# Patient Record
Sex: FEMALE | Race: OTHER | HISPANIC OR LATINO | URBAN - METROPOLITAN AREA
[De-identification: names, ages, dates, MRNs, and addresses within clinical notes are randomized per-mention and may not be internally consistent; named-entity substitution may affect disease eponyms.]

---

## 2019-08-11 ENCOUNTER — EMERGENCY (EMERGENCY)
Facility: HOSPITAL | Age: 64
LOS: 1 days | Discharge: HOME | End: 2019-08-11
Attending: EMERGENCY MEDICINE | Admitting: EMERGENCY MEDICINE
Payer: COMMERCIAL

## 2019-08-11 VITALS
OXYGEN SATURATION: 100 % | RESPIRATION RATE: 15 BRPM | HEART RATE: 100 BPM | DIASTOLIC BLOOD PRESSURE: 89 MMHG | SYSTOLIC BLOOD PRESSURE: 150 MMHG | TEMPERATURE: 98 F

## 2019-08-11 VITALS
SYSTOLIC BLOOD PRESSURE: 109 MMHG | RESPIRATION RATE: 16 BRPM | OXYGEN SATURATION: 100 % | DIASTOLIC BLOOD PRESSURE: 60 MMHG | HEART RATE: 59 BPM

## 2019-08-11 DIAGNOSIS — E89.0 POSTPROCEDURAL HYPOTHYROIDISM: Chronic | ICD-10-CM

## 2019-08-11 LAB
ALBUMIN SERPL ELPH-MCNC: 4 G/DL — SIGNIFICANT CHANGE UP (ref 3.3–5)
ALP SERPL-CCNC: 153 U/L — HIGH (ref 40–120)
ALT FLD-CCNC: 16 U/L — SIGNIFICANT CHANGE UP (ref 4–33)
ANION GAP SERPL CALC-SCNC: 11 MMO/L — SIGNIFICANT CHANGE UP (ref 7–14)
APPEARANCE UR: CLEAR — SIGNIFICANT CHANGE UP
AST SERPL-CCNC: 21 U/L — SIGNIFICANT CHANGE UP (ref 4–32)
BASE EXCESS BLDV CALC-SCNC: 7.6 MMOL/L — SIGNIFICANT CHANGE UP
BASOPHILS # BLD AUTO: 0.04 K/UL — SIGNIFICANT CHANGE UP (ref 0–0.2)
BASOPHILS NFR BLD AUTO: 0.4 % — SIGNIFICANT CHANGE UP (ref 0–2)
BILIRUB SERPL-MCNC: 0.7 MG/DL — SIGNIFICANT CHANGE UP (ref 0.2–1.2)
BILIRUB UR-MCNC: NEGATIVE — SIGNIFICANT CHANGE UP
BLOOD GAS VENOUS - CREATININE: 0.77 MG/DL — SIGNIFICANT CHANGE UP (ref 0.5–1.3)
BLOOD GAS VENOUS - FIO2: 21 — SIGNIFICANT CHANGE UP
BLOOD UR QL VISUAL: NEGATIVE — SIGNIFICANT CHANGE UP
BUN SERPL-MCNC: 13 MG/DL — SIGNIFICANT CHANGE UP (ref 7–23)
CALCIUM SERPL-MCNC: 9.4 MG/DL — SIGNIFICANT CHANGE UP (ref 8.4–10.5)
CHLORIDE BLDV-SCNC: 105 MMOL/L — SIGNIFICANT CHANGE UP (ref 96–108)
CHLORIDE SERPL-SCNC: 100 MMOL/L — SIGNIFICANT CHANGE UP (ref 98–107)
CO2 SERPL-SCNC: 28 MMOL/L — SIGNIFICANT CHANGE UP (ref 22–31)
COLOR SPEC: SIGNIFICANT CHANGE UP
CREAT SERPL-MCNC: 0.76 MG/DL — SIGNIFICANT CHANGE UP (ref 0.5–1.3)
EOSINOPHIL # BLD AUTO: 0.11 K/UL — SIGNIFICANT CHANGE UP (ref 0–0.5)
EOSINOPHIL NFR BLD AUTO: 1.2 % — SIGNIFICANT CHANGE UP (ref 0–6)
GAS PNL BLDV: 136 MMOL/L — SIGNIFICANT CHANGE UP (ref 136–146)
GLUCOSE BLDV-MCNC: 106 MG/DL — HIGH (ref 70–99)
GLUCOSE SERPL-MCNC: 108 MG/DL — HIGH (ref 70–99)
GLUCOSE UR-MCNC: NEGATIVE — SIGNIFICANT CHANGE UP
HCO3 BLDV-SCNC: 29 MMOL/L — HIGH (ref 20–27)
HCT VFR BLD CALC: 43.9 % — SIGNIFICANT CHANGE UP (ref 34.5–45)
HCT VFR BLDV CALC: 45.1 % — HIGH (ref 34.5–45)
HGB BLD-MCNC: 14.2 G/DL — SIGNIFICANT CHANGE UP (ref 11.5–15.5)
HGB BLDV-MCNC: 14.7 G/DL — SIGNIFICANT CHANGE UP (ref 11.5–15.5)
IMM GRANULOCYTES NFR BLD AUTO: 0.2 % — SIGNIFICANT CHANGE UP (ref 0–1.5)
KETONES UR-MCNC: NEGATIVE — SIGNIFICANT CHANGE UP
LACTATE BLDV-MCNC: 0.9 MMOL/L — SIGNIFICANT CHANGE UP (ref 0.5–2)
LACTATE BLDV-MCNC: 2.2 MMOL/L — HIGH (ref 0.5–2)
LEUKOCYTE ESTERASE UR-ACNC: NEGATIVE — SIGNIFICANT CHANGE UP
LIDOCAIN IGE QN: 19.4 U/L — SIGNIFICANT CHANGE UP (ref 7–60)
LYMPHOCYTES # BLD AUTO: 2.66 K/UL — SIGNIFICANT CHANGE UP (ref 1–3.3)
LYMPHOCYTES # BLD AUTO: 29.9 % — SIGNIFICANT CHANGE UP (ref 13–44)
MCHC RBC-ENTMCNC: 29.1 PG — SIGNIFICANT CHANGE UP (ref 27–34)
MCHC RBC-ENTMCNC: 32.3 % — SIGNIFICANT CHANGE UP (ref 32–36)
MCV RBC AUTO: 90 FL — SIGNIFICANT CHANGE UP (ref 80–100)
MONOCYTES # BLD AUTO: 0.56 K/UL — SIGNIFICANT CHANGE UP (ref 0–0.9)
MONOCYTES NFR BLD AUTO: 6.3 % — SIGNIFICANT CHANGE UP (ref 2–14)
NEUTROPHILS # BLD AUTO: 5.52 K/UL — SIGNIFICANT CHANGE UP (ref 1.8–7.4)
NEUTROPHILS NFR BLD AUTO: 62 % — SIGNIFICANT CHANGE UP (ref 43–77)
NITRITE UR-MCNC: NEGATIVE — SIGNIFICANT CHANGE UP
NRBC # FLD: 0 K/UL — SIGNIFICANT CHANGE UP (ref 0–0)
PCO2 BLDV: 46 MMHG — SIGNIFICANT CHANGE UP (ref 41–51)
PH BLDV: 7.46 PH — HIGH (ref 7.32–7.43)
PH UR: 8.5 — HIGH (ref 5–8)
PLATELET # BLD AUTO: 236 K/UL — SIGNIFICANT CHANGE UP (ref 150–400)
PMV BLD: 10.3 FL — SIGNIFICANT CHANGE UP (ref 7–13)
PO2 BLDV: 21 MMHG — LOW (ref 35–40)
POTASSIUM BLDV-SCNC: 4.4 MMOL/L — SIGNIFICANT CHANGE UP (ref 3.4–4.5)
POTASSIUM SERPL-MCNC: 4 MMOL/L — SIGNIFICANT CHANGE UP (ref 3.5–5.3)
POTASSIUM SERPL-SCNC: 4 MMOL/L — SIGNIFICANT CHANGE UP (ref 3.5–5.3)
PROT SERPL-MCNC: 7.7 G/DL — SIGNIFICANT CHANGE UP (ref 6–8.3)
PROT UR-MCNC: 10 — SIGNIFICANT CHANGE UP
RBC # BLD: 4.88 M/UL — SIGNIFICANT CHANGE UP (ref 3.8–5.2)
RBC # FLD: 14.5 % — SIGNIFICANT CHANGE UP (ref 10.3–14.5)
SAO2 % BLDV: 36.6 % — LOW (ref 60–85)
SODIUM SERPL-SCNC: 139 MMOL/L — SIGNIFICANT CHANGE UP (ref 135–145)
SP GR SPEC: 1.01 — SIGNIFICANT CHANGE UP (ref 1–1.04)
UROBILINOGEN FLD QL: NORMAL — SIGNIFICANT CHANGE UP
WBC # BLD: 8.91 K/UL — SIGNIFICANT CHANGE UP (ref 3.8–10.5)
WBC # FLD AUTO: 8.91 K/UL — SIGNIFICANT CHANGE UP (ref 3.8–10.5)

## 2019-08-11 PROCEDURE — 99284 EMERGENCY DEPT VISIT MOD MDM: CPT

## 2019-08-11 PROCEDURE — 74176 CT ABD & PELVIS W/O CONTRAST: CPT | Mod: 26

## 2019-08-11 PROCEDURE — 76700 US EXAM ABDOM COMPLETE: CPT | Mod: 26

## 2019-08-11 RX ORDER — ACETAMINOPHEN 500 MG
975 TABLET ORAL ONCE
Refills: 0 | Status: COMPLETED | OUTPATIENT
Start: 2019-08-11 | End: 2019-08-11

## 2019-08-11 RX ORDER — OXYCODONE HYDROCHLORIDE 5 MG/1
1 TABLET ORAL
Qty: 6 | Refills: 0
Start: 2019-08-11 | End: 2019-08-12

## 2019-08-11 RX ORDER — MORPHINE SULFATE 50 MG/1
4 CAPSULE, EXTENDED RELEASE ORAL ONCE
Refills: 0 | Status: DISCONTINUED | OUTPATIENT
Start: 2019-08-11 | End: 2019-08-11

## 2019-08-11 RX ORDER — SODIUM CHLORIDE 9 MG/ML
1000 INJECTION INTRAMUSCULAR; INTRAVENOUS; SUBCUTANEOUS ONCE
Refills: 0 | Status: COMPLETED | OUTPATIENT
Start: 2019-08-11 | End: 2019-08-11

## 2019-08-11 RX ORDER — FAMOTIDINE 10 MG/ML
20 INJECTION INTRAVENOUS ONCE
Refills: 0 | Status: COMPLETED | OUTPATIENT
Start: 2019-08-11 | End: 2019-08-11

## 2019-08-11 RX ORDER — HYDROMORPHONE HYDROCHLORIDE 2 MG/ML
1 INJECTION INTRAMUSCULAR; INTRAVENOUS; SUBCUTANEOUS ONCE
Refills: 0 | Status: DISCONTINUED | OUTPATIENT
Start: 2019-08-11 | End: 2019-08-11

## 2019-08-11 RX ORDER — ONDANSETRON 8 MG/1
4 TABLET, FILM COATED ORAL ONCE
Refills: 0 | Status: COMPLETED | OUTPATIENT
Start: 2019-08-11 | End: 2019-08-11

## 2019-08-11 RX ORDER — KETOROLAC TROMETHAMINE 30 MG/ML
15 SYRINGE (ML) INJECTION ONCE
Refills: 0 | Status: DISCONTINUED | OUTPATIENT
Start: 2019-08-11 | End: 2019-08-11

## 2019-08-11 RX ADMIN — HYDROMORPHONE HYDROCHLORIDE 1 MILLIGRAM(S): 2 INJECTION INTRAMUSCULAR; INTRAVENOUS; SUBCUTANEOUS at 10:14

## 2019-08-11 RX ADMIN — MORPHINE SULFATE 4 MILLIGRAM(S): 50 CAPSULE, EXTENDED RELEASE ORAL at 08:29

## 2019-08-11 RX ADMIN — FAMOTIDINE 20 MILLIGRAM(S): 10 INJECTION INTRAVENOUS at 07:16

## 2019-08-11 RX ADMIN — Medication 975 MILLIGRAM(S): at 09:44

## 2019-08-11 RX ADMIN — MORPHINE SULFATE 4 MILLIGRAM(S): 50 CAPSULE, EXTENDED RELEASE ORAL at 09:00

## 2019-08-11 RX ADMIN — MORPHINE SULFATE 4 MILLIGRAM(S): 50 CAPSULE, EXTENDED RELEASE ORAL at 07:15

## 2019-08-11 RX ADMIN — HYDROMORPHONE HYDROCHLORIDE 1 MILLIGRAM(S): 2 INJECTION INTRAMUSCULAR; INTRAVENOUS; SUBCUTANEOUS at 09:44

## 2019-08-11 RX ADMIN — ONDANSETRON 4 MILLIGRAM(S): 8 TABLET, FILM COATED ORAL at 07:14

## 2019-08-11 RX ADMIN — Medication 15 MILLIGRAM(S): at 08:24

## 2019-08-11 RX ADMIN — Medication 975 MILLIGRAM(S): at 10:14

## 2019-08-11 RX ADMIN — Medication 30 MILLILITER(S): at 07:16

## 2019-08-11 RX ADMIN — Medication 15 MILLIGRAM(S): at 07:54

## 2019-08-11 RX ADMIN — SODIUM CHLORIDE 1000 MILLILITER(S): 9 INJECTION INTRAMUSCULAR; INTRAVENOUS; SUBCUTANEOUS at 07:13

## 2019-08-11 RX ADMIN — MORPHINE SULFATE 4 MILLIGRAM(S): 50 CAPSULE, EXTENDED RELEASE ORAL at 08:33

## 2019-08-11 NOTE — ED ADULT NURSE NOTE - NSIMPLEMENTINTERV_GEN_ALL_ED
Implemented All Fall Risk Interventions:  [place] to call system. Call bell, personal items and telephone within reach. Instruct patient to call for assistance. Room bathroom lighting operational. Non-slip footwear when patient is off stretcher. Physically safe environment: no spills, clutter or unnecessary equipment. Stretcher in lowest position, wheels locked, appropriate side rails in place. Provide visual cue, wrist band, yellow gown, etc. Monitor gait and stability. Monitor for mental status changes and reorient to person, place, and time. Review medications for side effects contributing to fall risk. Reinforce activity limits and safety measures with patient and family.

## 2019-08-11 NOTE — ED ADULT TRIAGE NOTE - CHIEF COMPLAINT QUOTE
alert c/o severe right flank pain with nausea x 1 week became much worse yesterday   was initially intermittent now constant   hx gallstones  thyroidectomy very uncomfortable in triage

## 2019-08-11 NOTE — ED PROVIDER NOTE - CLINICAL SUMMARY MEDICAL DECISION MAKING FREE TEXT BOX
63 yo F c PMH of thyroid ca (s/p thyroidectomy 11 y/a, no h/o chemo/rad, in remission), HTN, gallstones p/w 3 day h/o worsening intermittent R flank pain radiating to R suprapubic region associated with nausea. On exam, , BP mildly elevated VS otherwise wnl. severe distress, +ruq and epigastric and R suprapubic ttp. abdirashid vs kidney stone. labs/ruq us pending. morphine/pepcid/maalox/zofran/ivf for tx. will reassess.

## 2019-08-11 NOTE — ED ADULT NURSE NOTE - OBJECTIVE STATEMENT
Pt received in room 3, AA&OX4. Pt has hx of HTN and gallstones. Pt c/o severe right flank pain for the past week worsening Sunday morning. Pt c/o nausea from pain. Pt states she had gallstones a couple of months ago and the pain went away on its own and has now returned. Pt reports pain was intermittent a throughout the week but is now constant with no relief. Pt looks very uncomfortable in stretcher. Pt denies CP, SOB, weakness, headache, fever, chills, vomiting, diarrhea. 20G placed in LT AC, labs collected and sent. Medication given as per MD order. Will continue to monitor.

## 2019-08-11 NOTE — ED PROVIDER NOTE - ATTENDING CONTRIBUTION TO CARE
65 y/o F with h/o HTN, gallstones here with right flank pain.  Pt reports 2-3 days of intermittent pain that became constant tonight.  Pt reports pain radiates to epigastrum.  Associated nausea, but denies vomiting, fever, diarrhea, urinary sxs.  Pt reports similar pain in the past andhad outpatient ultrasound performed showing gallstones at the time.  Well appearing, lying comfortably in stretcher, awake and alert, nontoxic.  VSS.  Lungs cta bl.  Cards nl S1/S2, RRR, no MRG.  Abd soft with mild epigastric and RUQ tenderness, right upper flank tenderness, no rebound or guarding, no cva tenderness, no rlq/mcburney's point tenderness.  Plan for labs, ua, ruq and renal us, ivfs, pain meds/antiemetics, reassess.  COnsider renal colic vs biliary disease also consider gastritis, pancreatitis.  If US unrevealing, will consider CT if cont symptomatology.

## 2019-08-11 NOTE — ED ADULT NURSE NOTE - CAS EDN DISCHARGE ASSESSMENT
Alert and oriented to person, place and time/AOx4, improved; denies flank pain/ other acute complaints at this time. Steady gait, well-appearing.

## 2019-08-11 NOTE — ED PROVIDER NOTE - NSFOLLOWUPINSTRUCTIONS_ED_ALL_ED_FT
Follow up with your primary physician or general surgery in 1-2 days. If needed call [phone] to find a primary care physician or call  [phone] to schedule an appointment with the general medicine.     You can take acetaminophen (aka tylenol, 1000 mg every 6-8 hours) for pain. You can also take ibuprofen (600 mg every 6-8 hours) in addition if tylenol alone does not improve the pain. Do not exceed 4000 mg acetaminophen (tylenol) in a 24 hour period. Be aware if any of your other medications contain acetaminophen so as not to exceed the maximum daily dose.    Return to the ER for recurrent severe pain, vomiting, fever, or any other new concerning symptoms.

## 2019-08-11 NOTE — ED PROVIDER NOTE - OBJECTIVE STATEMENT
65 yo F c PMH of thyroid ca (s/p thyroidectomy 11 y/a, no h/o chemo/rad, in remission), HTN, gallstones p/w 3 day h/o worsening intermittent R flank pain radiating to R suprapubic region associated with nausea. positive h/o starting 5 m/a, went to pmd who did outpt US showing gallstones, did not have cholecystectomy. last BM today wnl.

## 2019-08-11 NOTE — ED PROVIDER NOTE - PROGRESS NOTE DETAILS
Jonathan Weil, PGY3 - US non-diagnostic. Patient still in pain. Will obtain CT a/p. Jonathan Weil, PGY3 - patient reassessed. She is feeling substantially improved, pain now 2/10 compared to "11/10" at presentation. No abdominal tenderness. Negative Carr's sign. Trace R flank ttp, reduced from earlier. We discussed the results, and agreed on a plan for close outpt f/u and strict return precautions for recurrent pain or any other new symptoms. The patient feels comfortable with this plan. Results from today's visit were reviewed with the patient and a copy of the results provided for their records. All questions were addressed. Gong: Patient seen and re-examined.  Pt has no abdominal tenderness, neg mtz's.  Pt has R flank intermittent pain. No signs of nephrolithiasis on ct abd pelvis and US grossly neg.  Will discharge home with close follow up.

## 2019-08-12 ENCOUNTER — EMERGENCY (EMERGENCY)
Facility: HOSPITAL | Age: 64
LOS: 1 days | Discharge: HOME | End: 2019-08-12
Attending: EMERGENCY MEDICINE | Admitting: EMERGENCY MEDICINE
Payer: COMMERCIAL

## 2019-08-12 VITALS
HEART RATE: 88 BPM | TEMPERATURE: 98 F | SYSTOLIC BLOOD PRESSURE: 155 MMHG | OXYGEN SATURATION: 97 % | DIASTOLIC BLOOD PRESSURE: 97 MMHG | RESPIRATION RATE: 18 BRPM

## 2019-08-12 DIAGNOSIS — E89.0 POSTPROCEDURAL HYPOTHYROIDISM: Chronic | ICD-10-CM

## 2019-08-12 LAB
ALBUMIN SERPL ELPH-MCNC: 3.7 G/DL — SIGNIFICANT CHANGE UP (ref 3.3–5)
ALP SERPL-CCNC: 150 U/L — HIGH (ref 40–120)
ALT FLD-CCNC: 21 U/L — SIGNIFICANT CHANGE UP (ref 4–33)
ANION GAP SERPL CALC-SCNC: 11 MMO/L — SIGNIFICANT CHANGE UP (ref 7–14)
AST SERPL-CCNC: 47 U/L — HIGH (ref 4–32)
BACTERIA UR CULT: SIGNIFICANT CHANGE UP
BASOPHILS # BLD AUTO: 0.04 K/UL — SIGNIFICANT CHANGE UP (ref 0–0.2)
BASOPHILS NFR BLD AUTO: 0.5 % — SIGNIFICANT CHANGE UP (ref 0–2)
BILIRUB SERPL-MCNC: 0.3 MG/DL — SIGNIFICANT CHANGE UP (ref 0.2–1.2)
BUN SERPL-MCNC: 17 MG/DL — SIGNIFICANT CHANGE UP (ref 7–23)
CALCIUM SERPL-MCNC: 8.8 MG/DL — SIGNIFICANT CHANGE UP (ref 8.4–10.5)
CHLORIDE SERPL-SCNC: 102 MMOL/L — SIGNIFICANT CHANGE UP (ref 98–107)
CO2 SERPL-SCNC: 25 MMOL/L — SIGNIFICANT CHANGE UP (ref 22–31)
CREAT SERPL-MCNC: 0.67 MG/DL — SIGNIFICANT CHANGE UP (ref 0.5–1.3)
EOSINOPHIL # BLD AUTO: 0.12 K/UL — SIGNIFICANT CHANGE UP (ref 0–0.5)
EOSINOPHIL NFR BLD AUTO: 1.4 % — SIGNIFICANT CHANGE UP (ref 0–6)
GLUCOSE SERPL-MCNC: 123 MG/DL — HIGH (ref 70–99)
HCT VFR BLD CALC: 41.5 % — SIGNIFICANT CHANGE UP (ref 34.5–45)
HGB BLD-MCNC: 13.4 G/DL — SIGNIFICANT CHANGE UP (ref 11.5–15.5)
IMM GRANULOCYTES NFR BLD AUTO: 0.3 % — SIGNIFICANT CHANGE UP (ref 0–1.5)
LYMPHOCYTES # BLD AUTO: 3.45 K/UL — HIGH (ref 1–3.3)
LYMPHOCYTES # BLD AUTO: 39.3 % — SIGNIFICANT CHANGE UP (ref 13–44)
MCHC RBC-ENTMCNC: 29.6 PG — SIGNIFICANT CHANGE UP (ref 27–34)
MCHC RBC-ENTMCNC: 32.3 % — SIGNIFICANT CHANGE UP (ref 32–36)
MCV RBC AUTO: 91.8 FL — SIGNIFICANT CHANGE UP (ref 80–100)
MONOCYTES # BLD AUTO: 0.5 K/UL — SIGNIFICANT CHANGE UP (ref 0–0.9)
MONOCYTES NFR BLD AUTO: 5.7 % — SIGNIFICANT CHANGE UP (ref 2–14)
NEUTROPHILS # BLD AUTO: 4.64 K/UL — SIGNIFICANT CHANGE UP (ref 1.8–7.4)
NEUTROPHILS NFR BLD AUTO: 52.8 % — SIGNIFICANT CHANGE UP (ref 43–77)
NRBC # FLD: 0 K/UL — SIGNIFICANT CHANGE UP (ref 0–0)
PLATELET # BLD AUTO: 227 K/UL — SIGNIFICANT CHANGE UP (ref 150–400)
PMV BLD: 10.2 FL — SIGNIFICANT CHANGE UP (ref 7–13)
POTASSIUM SERPL-MCNC: 5 MMOL/L — SIGNIFICANT CHANGE UP (ref 3.5–5.3)
POTASSIUM SERPL-SCNC: 5 MMOL/L — SIGNIFICANT CHANGE UP (ref 3.5–5.3)
PROT SERPL-MCNC: 7.7 G/DL — SIGNIFICANT CHANGE UP (ref 6–8.3)
RBC # BLD: 4.52 M/UL — SIGNIFICANT CHANGE UP (ref 3.8–5.2)
RBC # FLD: 14.6 % — HIGH (ref 10.3–14.5)
SODIUM SERPL-SCNC: 138 MMOL/L — SIGNIFICANT CHANGE UP (ref 135–145)
SPECIMEN SOURCE: SIGNIFICANT CHANGE UP
WBC # BLD: 8.78 K/UL — SIGNIFICANT CHANGE UP (ref 3.8–10.5)
WBC # FLD AUTO: 8.78 K/UL — SIGNIFICANT CHANGE UP (ref 3.8–10.5)

## 2019-08-12 PROCEDURE — 99284 EMERGENCY DEPT VISIT MOD MDM: CPT

## 2019-08-12 RX ORDER — DIAZEPAM 5 MG
5 TABLET ORAL ONCE
Refills: 0 | Status: DISCONTINUED | OUTPATIENT
Start: 2019-08-12 | End: 2019-08-12

## 2019-08-12 RX ORDER — ONDANSETRON 8 MG/1
4 TABLET, FILM COATED ORAL ONCE
Refills: 0 | Status: COMPLETED | OUTPATIENT
Start: 2019-08-12 | End: 2019-08-12

## 2019-08-12 RX ORDER — KETOROLAC TROMETHAMINE 30 MG/ML
15 SYRINGE (ML) INJECTION ONCE
Refills: 0 | Status: DISCONTINUED | OUTPATIENT
Start: 2019-08-12 | End: 2019-08-12

## 2019-08-12 RX ORDER — LIDOCAINE 4 G/100G
1 CREAM TOPICAL ONCE
Refills: 0 | Status: COMPLETED | OUTPATIENT
Start: 2019-08-12 | End: 2019-08-12

## 2019-08-12 RX ADMIN — LIDOCAINE 1 PATCH: 4 CREAM TOPICAL at 22:31

## 2019-08-12 RX ADMIN — Medication 5 MILLIGRAM(S): at 22:31

## 2019-08-12 RX ADMIN — ONDANSETRON 4 MILLIGRAM(S): 8 TABLET, FILM COATED ORAL at 23:38

## 2019-08-12 RX ADMIN — Medication 15 MILLIGRAM(S): at 22:31

## 2019-08-12 NOTE — ED ADULT TRIAGE NOTE - CHIEF COMPLAINT QUOTE
Pt c/o  intermittent R side abd pain. As per pt, was seen in ER yesterday and told she has gallstones. Pt appears uncomfortable when pain comes on.. C/o Nausea. Pain worsens when eating greasy food. Hx: HTN

## 2019-08-12 NOTE — ED PROVIDER NOTE - NS ED ROS FT
ROS:  GENERAL: No fever, no chills  EYES: no change in vision  HEENT: no trouble swallowing, no trouble speaking  CARDIAC: no chest pain  PULMONARY: no cough, no shortness of breath  GI: no abdominal pain, no nausea, no vomiting, no diarrhea, no constipation  : No dysuria, no frequency, no change in appearance, or odor of urine  SKIN: no rashes  NEURO: no headache, no weakness  MSK: +flank pain

## 2019-08-12 NOTE — ED PROVIDER NOTE - ATTENDING CONTRIBUTION TO CARE
Patient presented to ed with 3 days of r. mid back/flank pain, intermittent, severe, sharp, 10/10, nonradiating. No associated fever, chills, ha, cp, sob, abd pain, vomiting, diarrhea, dysuria. Had labs/ct/us performed for same pain in ed just prior and no acute pathology found. Pain worsens primarily on movement, improves when staying still.  exam  GEN - NAD; A+O x3   HEAD - NC/AT   EYES- PERRL, EOMI  ENT: Airway patent, mmm, Oral cavity and pharynx normal.    NECK: Neck supple, non-tender without lymphadenopathy, no masses.  PULMONARY - CTA b/l, symmetric breath sounds.   CARDIAC -s1s2, RRR, no M,G,R  ABDOMEN - +BS, ND, NT, soft, no guarding, no rebound, no masses   BACK - no CVA tenderness, Normal  spine, no midline or paraspinal tenderness, neg slr, pain reproducible with ambulation and with ranging of trunk.   EXTREMITIES - FROM, symmetric pulses, capillary refill < 2 seconds, no edema   SKIN - no rash or bruising   NEUROLOGIC - alert, speech clear, 5/5 strength in b/l ue and b/l le, sensation intact.  PSYCH -nl mood/affect, nl insight.  a/p-patient presented to ed with 3d r. back/flank pain with previously neg ct/us-suspect msk cause-strain/spasm v. herniated disc. Neuro intact, well appearing, vss. Plan for pain ctrl, reass.

## 2019-08-12 NOTE — ED PROVIDER NOTE - OBJECTIVE STATEMENT
63 y/o F PMHx HTN, HLD, hypothyroidism (sp thyroidectomy for thyroid CA, in remission), fibromyalgia, morbid obesity here w/ R flank pain x 3days. Pain is described as sharp and burning, worse w/movement. Pain is not pleuritic, does not get worse w/ PO intake (ate chicken parmesan tonight w/o worsening of sx). Was seen in the ED yesterday, had a CT abdomen and GB US which confirmed cholelithiasis. Pt states her pain temporarily improved in the ED, but never resolved. Took Percocet this morning w/o relief of pain. Denies fevers, chills, N/V/D, dysuria, rashes, shortness of breath or any other complaints.

## 2019-08-12 NOTE — ED PROVIDER NOTE - CLINICAL SUMMARY MEDICAL DECISION MAKING FREE TEXT BOX
63 y/o F w/ flank pain x 3 days, sp ct abdomen and RUQ us that showed cholelithiasis. No rashes to suggest shingles. Pain is not pleuritic to suggest PE, no consolidations or pleural effusions noted to the R lower lung base which could cause pts pain. Pain is likely msk, will treat w/ valium, toradol and lidocaine patch. Reassess.

## 2019-08-12 NOTE — ED ADULT NURSE NOTE - OBJECTIVE STATEMENT
Received pt in intake room 13. pt A&Ox3, ambulatory. Pt c/o intermittent R side abd pain that radiates to the right lower back x a few days. As per pt, was seen in ER yesterday and told she has gallstones. Pt appears uncomfortable, but in no apparent distress. As per pt the pain is worse when eating greasy foods. HX. HTN. Respirations are equal, nonlabored, no respiratory distress noted. Denies any recent trauma/injury/or fall. No obvious injuries or deformities noted to the back. Denies chest pain, sob, n/v/d, fever, cough, dizziness, headache, urinary complications. 20 gauge IV placed in the right arm, labs sent. pt medicated as per orders. awaiting further plan

## 2019-08-12 NOTE — ED PROVIDER NOTE - PHYSICAL EXAMINATION
Back: No visible deformities or swelling, neck supple non tender, no midline tenderness, no reproducible paraspinal tenderness, ROM intact, sensation intact, dp/pt pulses 2+, lower and upper extremity strength 5/5 PHILLIP, gait normal

## 2019-08-12 NOTE — ED PROVIDER NOTE - PROGRESS NOTE DETAILS
JERSEY Cochran: Pt reassessed, feeling better however continues have back pain. Will put pt in CDU for pain control, MRI T/L spine. Sx likely 2/2 muscle spasms. Spoke w/ MRI tech, state knee replacements are MRI compatible. PT ok w/ staying overnight for MRI and CDU.

## 2019-08-13 VITALS
OXYGEN SATURATION: 95 % | HEART RATE: 68 BPM | DIASTOLIC BLOOD PRESSURE: 78 MMHG | SYSTOLIC BLOOD PRESSURE: 133 MMHG | RESPIRATION RATE: 17 BRPM | TEMPERATURE: 97 F

## 2019-08-13 PROBLEM — C73 MALIGNANT NEOPLASM OF THYROID GLAND: Chronic | Status: ACTIVE | Noted: 2019-08-11

## 2019-08-13 PROCEDURE — 72148 MRI LUMBAR SPINE W/O DYE: CPT | Mod: 26

## 2019-08-13 PROCEDURE — 72146 MRI CHEST SPINE W/O DYE: CPT | Mod: 26

## 2019-08-13 PROCEDURE — 99217: CPT

## 2019-08-13 RX ORDER — OXYCODONE AND ACETAMINOPHEN 5; 325 MG/1; MG/1
1 TABLET ORAL
Qty: 18 | Refills: 0
Start: 2019-08-13 | End: 2019-08-15

## 2019-08-13 RX ORDER — FENTANYL CITRATE 50 UG/ML
25 INJECTION INTRAVENOUS ONCE
Refills: 0 | Status: DISCONTINUED | OUTPATIENT
Start: 2019-08-13 | End: 2019-08-13

## 2019-08-13 RX ORDER — ATORVASTATIN CALCIUM 80 MG/1
40 TABLET, FILM COATED ORAL AT BEDTIME
Refills: 0 | Status: DISCONTINUED | OUTPATIENT
Start: 2019-08-13 | End: 2019-08-16

## 2019-08-13 RX ORDER — KETOROLAC TROMETHAMINE 30 MG/ML
15 SYRINGE (ML) INJECTION ONCE
Refills: 0 | Status: DISCONTINUED | OUTPATIENT
Start: 2019-08-13 | End: 2019-08-13

## 2019-08-13 RX ORDER — GABAPENTIN 400 MG/1
300 CAPSULE ORAL ONCE
Refills: 0 | Status: COMPLETED | OUTPATIENT
Start: 2019-08-13 | End: 2019-08-13

## 2019-08-13 RX ORDER — DIAZEPAM 5 MG
5 TABLET ORAL ONCE
Refills: 0 | Status: DISCONTINUED | OUTPATIENT
Start: 2019-08-13 | End: 2019-08-13

## 2019-08-13 RX ORDER — LEVOTHYROXINE SODIUM 125 MCG
175 TABLET ORAL DAILY
Refills: 0 | Status: DISCONTINUED | OUTPATIENT
Start: 2019-08-13 | End: 2019-08-16

## 2019-08-13 RX ORDER — AMLODIPINE BESYLATE 2.5 MG/1
5 TABLET ORAL ONCE
Refills: 0 | Status: COMPLETED | OUTPATIENT
Start: 2019-08-13 | End: 2019-08-13

## 2019-08-13 RX ORDER — LISINOPRIL 2.5 MG/1
40 TABLET ORAL DAILY
Refills: 0 | Status: DISCONTINUED | OUTPATIENT
Start: 2019-08-13 | End: 2019-08-16

## 2019-08-13 RX ORDER — CYCLOBENZAPRINE HYDROCHLORIDE 10 MG/1
1 TABLET, FILM COATED ORAL
Qty: 15 | Refills: 0
Start: 2019-08-13 | End: 2019-08-17

## 2019-08-13 RX ORDER — DIAZEPAM 5 MG
1 TABLET ORAL
Qty: 9 | Refills: 0
Start: 2019-08-13 | End: 2019-08-15

## 2019-08-13 RX ORDER — IBUPROFEN 200 MG
1 TABLET ORAL
Qty: 21 | Refills: 0
Start: 2019-08-13 | End: 2019-08-19

## 2019-08-13 RX ORDER — OXYCODONE AND ACETAMINOPHEN 5; 325 MG/1; MG/1
1 TABLET ORAL ONCE
Refills: 0 | Status: DISCONTINUED | OUTPATIENT
Start: 2019-08-13 | End: 2019-08-13

## 2019-08-13 RX ADMIN — Medication 15 MILLIGRAM(S): at 09:37

## 2019-08-13 RX ADMIN — GABAPENTIN 300 MILLIGRAM(S): 400 CAPSULE ORAL at 06:06

## 2019-08-13 RX ADMIN — OXYCODONE AND ACETAMINOPHEN 1 TABLET(S): 5; 325 TABLET ORAL at 09:37

## 2019-08-13 RX ADMIN — Medication 5 MILLIGRAM(S): at 10:23

## 2019-08-13 RX ADMIN — FENTANYL CITRATE 25 MICROGRAM(S): 50 INJECTION INTRAVENOUS at 00:46

## 2019-08-13 RX ADMIN — Medication 175 MICROGRAM(S): at 06:05

## 2019-08-13 NOTE — ED CDU PROVIDER INITIAL DAY NOTE - ATTENDING CONTRIBUTION TO CARE
Dr. Cordon:  I performed a face to face bedside interview with patient regarding history of present illness, review of symptoms and past medical history. I completed an independent physical exam.  I have discussed patient's plan of care with PA.   I agree with note as stated above, having amended the EMR as needed to reflect my findings.   This includes HISTORY OF PRESENT ILLNESS, HIV, PAST MEDICAL/SURGICAL/FAMILY/SOCIAL HISTORY, ALLERGIES AND HOME MEDICATIONS, REVIEW OF SYSTEMS, PHYSICAL EXAM, and any PROGRESS NOTES during the time I functioned as the attending physician for this patient.    64F h/o HTN, HLD, hypothyroidism, presented to ED with 3 days of right flank pain, worse with movement.  No neuro deficits.    Exam:  - nad  - rrr  - ctab   -abd soft ntnd  - no spinal midline TTP, 5/5 strength BLE    A/P  - likely muscular pain, pending MRI in CDU

## 2019-08-13 NOTE — ED CDU PROVIDER INITIAL DAY NOTE - OBJECTIVE STATEMENT
63 y/o F PMHx HTN, HLD, hypothyroidism (sp thyroidectomy for thyroid CA, in remission), fibromyalgia, morbid obesity here w/ R flank pain x 3days. Pain is described as sharp and burning, worse w/movement. Pain is not pleuritic, does not get worse w/ PO intake (ate chicken parmesan tonight w/o worsening of sx). Was seen in the ED yesterday, had a CT abdomen and GB US which confirmed cholelithiasis. Pt states her pain temporarily improved in the ED, but never resolved. Took Percocet this morning w/o relief of pain. Denies fevers, chills, N/V/D, dysuria, rashes, shortness of breath or any other complaints.    CDU note: 63 y/o F PMHx HTN, HLD, hypothyroidism (sp thyroidectomy for thyroid CA, in remission), fibromyalgia, morbid obesity presents to the ED complaining of right flank pain for 3 days. Pt states pain is sharp, radiates to the mid-low back. worse with movement, no relieving factors. Pt was seen here yesterday, had CT A/P which only showed gallstones. Pt returns today with worsening low back pain; denies weakness, numbness, tingling sensation, urinary/bowel incontinence; denies any other complaints.

## 2019-08-13 NOTE — ED CDU PROVIDER DISPOSITION NOTE - ATTENDING CONTRIBUTION TO CARE
Dr. Cordon:  I performed a face to face bedside interview with patient regarding history of present illness, review of symptoms and past medical history. I completed an independent physical exam.  I have discussed patient's plan of care with PA.   I agree with note as stated above, having amended the EMR as needed to reflect my findings.   This includes HISTORY OF PRESENT ILLNESS, HIV, PAST MEDICAL/SURGICAL/FAMILY/SOCIAL HISTORY, ALLERGIES AND HOME MEDICATIONS, REVIEW OF SYSTEMS, PHYSICAL EXAM, and any PROGRESS NOTES during the time I functioned as the attending physician for this patient.

## 2019-08-13 NOTE — ED CDU PROVIDER INITIAL DAY NOTE - MEDICAL DECISION MAKING DETAILS
65 y/o F PMHx HTN, HLD, hypothyroidism (sp thyroidectomy for thyroid CA, in remission), fibromyalgia, morbid obesity presents to the ED complaining of right flank pain for 3 days. Pain clinically suspicious for lumbar spine etiology although no clinical evidence of compression cord syndrome at this time. Pt is placed in CDU for MR thoracolumbar spine, pain control. Monitor and Reassess.

## 2019-08-13 NOTE — ED CDU PROVIDER DISPOSITION NOTE - CLINICAL COURSE
Bravo:  64F h/o HTN, HLD, hypothyroidism, presented to ED with 3 days of right flank pain, worse with movement.  No neuro deficits.  Sent to CDU for pain control and MRI.  MRI with some disc bulging and foraminal narrowing but no acute pathology requiring intervention.  Pain improved.  Discharge with meds and outpatient follow up.

## 2019-08-13 NOTE — ED CDU PROVIDER DISPOSITION NOTE - NSFOLLOWUPCLINICS_GEN_ALL_ED_FT
[facility]  .  [place]   Phone: [phone]  Fax:   Follow Up Time:

## 2019-08-13 NOTE — ED CDU PROVIDER INITIAL DAY NOTE - DETAILS
63 y/o F PMHx HTN, HLD, hypothyroidism (sp thyroidectomy for thyroid CA, in remission), fibromyalgia, morbid obesity presents to the ED complaining of right flank pain for 3 days. Pain clinically suspicious for lumbar spine etiology although no clinical evidence of compression cord syndrome at this time. Pt is placed in CDU for MR thoracolumbar spine, pain control. Monitor and Reassess.

## 2019-08-13 NOTE — ED CDU PROVIDER INITIAL DAY NOTE - PROGRESS NOTE DETAILS
Sign out report received from overnight PA, pt ambulatory in CDU from bed to bathroom, MRI shows multi-level disc protrusions w/ no evidence of cord compression. Pt stable to be discharged from the CDU, pt advised to f/u with Ortho outpatient, pt will likely benefit from physical therapy, acutely will be treated w/ nsaid's, percocet and muscle relaxants, pt advised of the compounding effects of percocet and flexeril, advised no driving, etoh use w/ these meds, no operating heavy machinery. Pt expressed understanding of instructions and will f/u accordingly.

## 2019-08-13 NOTE — ED CDU PROVIDER DISPOSITION NOTE - NSFOLLOWUPINSTRUCTIONS_ED_ALL_ED_FT
Follow up with your primary care provider within 48 hours  Follow up with an orthopedic doctor within one week  Take Motrin 600mg every 8 hours as needed for pain, take with food. Take Percocet 325/5 once every 6 hours as needed for severe pain -- causes drowsiness; DO NOT drink alcohol, drive, or operate heavy machinery with this medication.  Caution federal law prohibits the transfer of this drug to any person other  than the person for whom it was prescribed. May cause drowsiness.  Alcohol may intensify this effect.  Use care when operating dangerous machinery.  This prescription cannot be refilled. Using more of this medication than prescribed may cause serious breathing problems Return to the emergency department with any worsening or concerning symptoms, swelling, numbness/tingling, inability to bear weight or any other concerns.

## 2020-01-16 ENCOUNTER — APPOINTMENT (OUTPATIENT)
Dept: DERMATOLOGY | Facility: CLINIC | Age: 65
End: 2020-01-16

## 2020-02-17 ENCOUNTER — TRANSCRIPTION ENCOUNTER (OUTPATIENT)
Age: 65
End: 2020-02-17

## 2021-09-23 ENCOUNTER — NON-APPOINTMENT (OUTPATIENT)
Age: 66
End: 2021-09-23

## 2021-12-09 ENCOUNTER — APPOINTMENT (OUTPATIENT)
Dept: NEUROLOGY | Facility: CLINIC | Age: 66
End: 2021-12-09

## 2022-03-18 ENCOUNTER — APPOINTMENT (OUTPATIENT)
Dept: MRI IMAGING | Facility: IMAGING CENTER | Age: 67
End: 2022-03-18

## 2022-04-22 ENCOUNTER — EMERGENCY (EMERGENCY)
Facility: HOSPITAL | Age: 67
LOS: 1 days | Discharge: HOME | End: 2022-04-22
Attending: EMERGENCY MEDICINE | Admitting: EMERGENCY MEDICINE
Payer: MEDICARE

## 2022-04-22 VITALS
HEART RATE: 100 BPM | OXYGEN SATURATION: 100 % | RESPIRATION RATE: 16 BRPM | SYSTOLIC BLOOD PRESSURE: 150 MMHG | TEMPERATURE: 98 F | DIASTOLIC BLOOD PRESSURE: 78 MMHG

## 2022-04-22 VITALS
OXYGEN SATURATION: 100 % | SYSTOLIC BLOOD PRESSURE: 130 MMHG | DIASTOLIC BLOOD PRESSURE: 76 MMHG | HEART RATE: 80 BPM | RESPIRATION RATE: 18 BRPM

## 2022-04-22 DIAGNOSIS — E89.0 POSTPROCEDURAL HYPOTHYROIDISM: Chronic | ICD-10-CM

## 2022-04-22 LAB
ANION GAP SERPL CALC-SCNC: 10 MMOL/L — SIGNIFICANT CHANGE UP (ref 7–14)
BASOPHILS # BLD AUTO: 0.04 K/UL — SIGNIFICANT CHANGE UP (ref 0–0.2)
BASOPHILS NFR BLD AUTO: 0.4 % — SIGNIFICANT CHANGE UP (ref 0–2)
BUN SERPL-MCNC: 16 MG/DL — SIGNIFICANT CHANGE UP (ref 7–23)
CALCIUM SERPL-MCNC: 9.3 MG/DL — SIGNIFICANT CHANGE UP (ref 8.4–10.5)
CHLORIDE SERPL-SCNC: 105 MMOL/L — SIGNIFICANT CHANGE UP (ref 98–107)
CO2 SERPL-SCNC: 29 MMOL/L — SIGNIFICANT CHANGE UP (ref 22–31)
CREAT SERPL-MCNC: 0.75 MG/DL — SIGNIFICANT CHANGE UP (ref 0.5–1.3)
EGFR: 88 ML/MIN/1.73M2 — SIGNIFICANT CHANGE UP
EOSINOPHIL # BLD AUTO: 0.08 K/UL — SIGNIFICANT CHANGE UP (ref 0–0.5)
EOSINOPHIL NFR BLD AUTO: 0.9 % — SIGNIFICANT CHANGE UP (ref 0–6)
GLUCOSE SERPL-MCNC: 87 MG/DL — SIGNIFICANT CHANGE UP (ref 70–99)
HCT VFR BLD CALC: 42.5 % — SIGNIFICANT CHANGE UP (ref 34.5–45)
HGB BLD-MCNC: 13.6 G/DL — SIGNIFICANT CHANGE UP (ref 11.5–15.5)
IANC: 6.34 K/UL — SIGNIFICANT CHANGE UP (ref 1.8–7.4)
IMM GRANULOCYTES NFR BLD AUTO: 0.2 % — SIGNIFICANT CHANGE UP (ref 0–1.5)
LYMPHOCYTES # BLD AUTO: 2.08 K/UL — SIGNIFICANT CHANGE UP (ref 1–3.3)
LYMPHOCYTES # BLD AUTO: 22.8 % — SIGNIFICANT CHANGE UP (ref 13–44)
MCHC RBC-ENTMCNC: 28.9 PG — SIGNIFICANT CHANGE UP (ref 27–34)
MCHC RBC-ENTMCNC: 32 GM/DL — SIGNIFICANT CHANGE UP (ref 32–36)
MCV RBC AUTO: 90.4 FL — SIGNIFICANT CHANGE UP (ref 80–100)
MONOCYTES # BLD AUTO: 0.55 K/UL — SIGNIFICANT CHANGE UP (ref 0–0.9)
MONOCYTES NFR BLD AUTO: 6 % — SIGNIFICANT CHANGE UP (ref 2–14)
NEUTROPHILS # BLD AUTO: 6.34 K/UL — SIGNIFICANT CHANGE UP (ref 1.8–7.4)
NEUTROPHILS NFR BLD AUTO: 69.7 % — SIGNIFICANT CHANGE UP (ref 43–77)
NRBC # BLD: 0 /100 WBCS — SIGNIFICANT CHANGE UP
NRBC # FLD: 0 K/UL — SIGNIFICANT CHANGE UP
PLATELET # BLD AUTO: 198 K/UL — SIGNIFICANT CHANGE UP (ref 150–400)
POTASSIUM SERPL-MCNC: 4.2 MMOL/L — SIGNIFICANT CHANGE UP (ref 3.5–5.3)
POTASSIUM SERPL-SCNC: 4.2 MMOL/L — SIGNIFICANT CHANGE UP (ref 3.5–5.3)
RBC # BLD: 4.7 M/UL — SIGNIFICANT CHANGE UP (ref 3.8–5.2)
RBC # FLD: 14.1 % — SIGNIFICANT CHANGE UP (ref 10.3–14.5)
SARS-COV-2 RNA SPEC QL NAA+PROBE: SIGNIFICANT CHANGE UP
SODIUM SERPL-SCNC: 144 MMOL/L — SIGNIFICANT CHANGE UP (ref 135–145)
WBC # BLD: 9.11 K/UL — SIGNIFICANT CHANGE UP (ref 3.8–10.5)
WBC # FLD AUTO: 9.11 K/UL — SIGNIFICANT CHANGE UP (ref 3.8–10.5)

## 2022-04-22 PROCEDURE — 99284 EMERGENCY DEPT VISIT MOD MDM: CPT

## 2022-04-22 RX ORDER — OXYCODONE HYDROCHLORIDE 5 MG/1
1 TABLET ORAL
Qty: 12 | Refills: 0
Start: 2022-04-22 | End: 2022-04-24

## 2022-04-22 RX ORDER — ONDANSETRON 8 MG/1
4 TABLET, FILM COATED ORAL ONCE
Refills: 0 | Status: COMPLETED | OUTPATIENT
Start: 2022-04-22 | End: 2022-04-22

## 2022-04-22 RX ORDER — ACETAMINOPHEN 500 MG
650 TABLET ORAL ONCE
Refills: 0 | Status: COMPLETED | OUTPATIENT
Start: 2022-04-22 | End: 2022-04-22

## 2022-04-22 RX ORDER — LIDOCAINE 4 G/100G
1 CREAM TOPICAL ONCE
Refills: 0 | Status: COMPLETED | OUTPATIENT
Start: 2022-04-22 | End: 2022-04-22

## 2022-04-22 RX ORDER — SODIUM CHLORIDE 9 MG/ML
1000 INJECTION INTRAMUSCULAR; INTRAVENOUS; SUBCUTANEOUS ONCE
Refills: 0 | Status: COMPLETED | OUTPATIENT
Start: 2022-04-22 | End: 2022-04-22

## 2022-04-22 RX ORDER — MORPHINE SULFATE 50 MG/1
4 CAPSULE, EXTENDED RELEASE ORAL ONCE
Refills: 0 | Status: DISCONTINUED | OUTPATIENT
Start: 2022-04-22 | End: 2022-04-22

## 2022-04-22 RX ORDER — CYCLOBENZAPRINE HYDROCHLORIDE 10 MG/1
5 TABLET, FILM COATED ORAL ONCE
Refills: 0 | Status: COMPLETED | OUTPATIENT
Start: 2022-04-22 | End: 2022-04-22

## 2022-04-22 RX ADMIN — MORPHINE SULFATE 4 MILLIGRAM(S): 50 CAPSULE, EXTENDED RELEASE ORAL at 14:52

## 2022-04-22 RX ADMIN — Medication 650 MILLIGRAM(S): at 14:52

## 2022-04-22 RX ADMIN — Medication 650 MILLIGRAM(S): at 16:10

## 2022-04-22 RX ADMIN — SODIUM CHLORIDE 1000 MILLILITER(S): 9 INJECTION INTRAMUSCULAR; INTRAVENOUS; SUBCUTANEOUS at 14:51

## 2022-04-22 RX ADMIN — MORPHINE SULFATE 4 MILLIGRAM(S): 50 CAPSULE, EXTENDED RELEASE ORAL at 15:21

## 2022-04-22 RX ADMIN — CYCLOBENZAPRINE HYDROCHLORIDE 5 MILLIGRAM(S): 10 TABLET, FILM COATED ORAL at 14:52

## 2022-04-22 NOTE — ED ADULT NURSE NOTE - CHIEF COMPLAINT QUOTE
r  lower back pains x 4 days- describes as sharp, pain increases upon lying  flat. denies injury, problems urinating

## 2022-04-22 NOTE — ED PROVIDER NOTE - NS ED ATTENDING STATEMENT MOD
This was a shared visit with the KATHARINE. I reviewed and verified the documentation and independently performed the documented:

## 2022-04-22 NOTE — ED PROVIDER NOTE - PATIENT PORTAL LINK FT
You can access the FollowMyHealth Patient Portal offered by [facility] by registering at the following website: [email or web address]. By joining [employer] [facility]’s FollowMyHealth portal, you will also be able to view your health information using other applications (apps) compatible with our system.

## 2022-04-22 NOTE — ED PROVIDER NOTE - OBJECTIVE STATEMENT
66 y F h/o known disc herniations here w/ complaint of right lower back pain for 4 days. Gradual onset, progressively worsening, states it feels similar to multiple episodes of back pain she has had in the past. She had a stay in the CDU previously for back pain, and states she had an MRI one month ago which demonstrated multiple disc herniations. Pt denies numbness, tingling, or weakness to her extremities or urinary complaints. Denies any IV drug use, HIV, AIDS, or active cancer. Pt reports difficulty walking today due to severity of pain.

## 2022-04-22 NOTE — ED PROVIDER NOTE - ATTENDING APP SHARED VISIT CONTRIBUTION OF CARE
DR. LIN, ATTENDING MD-  I personally saw the patient with the PA and performed a substantive portion of the visit including all aspects of the medical decision making.    HPI / ROS / PE / MDM written by myself.

## 2022-04-22 NOTE — ED PROVIDER NOTE - NSICDXFAMILYHX_GEN_ALL_CORE_FT
FAMILY HISTORY:  Father  Still living? Unknown  Family history of diabetes mellitus, Age at diagnosis: Age Unknown    Mother  Still living? Unknown  Family history of diabetes mellitus, Age at diagnosis: Age Unknown

## 2022-04-22 NOTE — ED PROVIDER NOTE - NS CPE EDP MUSC LUMBAR LOC
No midline spinal tenderness. Mild paraspinal tenderness to the upper lumbar region. No midline spinal tenderness. Mild right paraspinal tenderness to the upper lumbar region.

## 2022-04-22 NOTE — ED PROVIDER NOTE - CHPI ED SYMPTOMS NEG
no dysuria, no hematuria, no weakness/no bladder dysfunction/no bowel dysfunction/no numbness/no tingling

## 2022-04-22 NOTE — ED PROVIDER NOTE - CLINICAL SUMMARY MEDICAL DECISION MAKING FREE TEXT BOX
66y F w/ h/o disc herniations presenting w/ right lower back pain. No signs of cauda equina. Will treat symptomatically, will need to pass gait trial to d/c. Will give spine center f/u if pt is able to ambulate.

## 2022-04-22 NOTE — ED ADULT NURSE NOTE - OBJECTIVE STATEMENT
pt A&ox4, came to ED for left flank pain and headache that started yesterday. pt denies difficulty urinating, blurrt vision. pt denies Chest pain and SOB. breathing is spontaneous and unlabored. sating 99% on RA. bilateral pedal and radial pulses palpable and strong. right 20g IV placed Labs drawn and sent as per ordered. Bed in lowest position, call bell within reach, all other safety and comfort measures provided. awaiting labs results and further orders.

## 2022-04-22 NOTE — ED ADULT NURSE REASSESSMENT NOTE - NS ED NURSE REASSESS COMMENT FT1
Patient reporting her back pain has significantly improved. Patient awaiting lab results. Patient able to ambulate. Safety maintained. Patient stable upon exiting the room.

## 2022-04-22 NOTE — ED PROVIDER NOTE - NSFOLLOWUPINSTRUCTIONS_ED_ALL_ED_FT
Follow with your PMD within 48-72 hours.      Rest, no heavy lifting.  Warm compresses to area.     Recommend Ortho consult to discuss possible Physical Therapy   Light walking.     Take Motrin 600 mg every 8 hours for pain with food,    Oxycodone every 6 hours as needed for muscle spasm- caution drowsiness/do not drive. Any worsening pain, weakness, numbness, bowel or urinary incontinence return to ER

## 2022-04-22 NOTE — ED PROVIDER NOTE - NSICDXPASTMEDICALHX_GEN_ALL_CORE_FT
PAST MEDICAL HISTORY:  Fibromyalgia     HLD (hyperlipidemia)     HTN (hypertension)     Hypothyroidism     Thyroid cancer

## 2022-04-23 ENCOUNTER — EMERGENCY (EMERGENCY)
Facility: HOSPITAL | Age: 67
LOS: 1 days | Discharge: HOME | End: 2022-04-23
Attending: EMERGENCY MEDICINE | Admitting: EMERGENCY MEDICINE
Payer: MEDICARE

## 2022-04-23 VITALS
TEMPERATURE: 98 F | DIASTOLIC BLOOD PRESSURE: 83 MMHG | SYSTOLIC BLOOD PRESSURE: 130 MMHG | HEART RATE: 99 BPM | OXYGEN SATURATION: 100 % | RESPIRATION RATE: 16 BRPM

## 2022-04-23 DIAGNOSIS — E89.0 POSTPROCEDURAL HYPOTHYROIDISM: Chronic | ICD-10-CM

## 2022-04-23 PROCEDURE — 99284 EMERGENCY DEPT VISIT MOD MDM: CPT

## 2022-04-23 RX ORDER — KETOROLAC TROMETHAMINE 30 MG/ML
15 SYRINGE (ML) INJECTION ONCE
Refills: 0 | Status: DISCONTINUED | OUTPATIENT
Start: 2022-04-23 | End: 2022-04-23

## 2022-04-23 RX ORDER — ACETAMINOPHEN 500 MG
975 TABLET ORAL ONCE
Refills: 0 | Status: COMPLETED | OUTPATIENT
Start: 2022-04-23 | End: 2022-04-23

## 2022-04-23 RX ORDER — DIAZEPAM 5 MG
5 TABLET ORAL ONCE
Refills: 0 | Status: DISCONTINUED | OUTPATIENT
Start: 2022-04-23 | End: 2022-04-23

## 2022-04-23 RX ORDER — DIAZEPAM 5 MG
1 TABLET ORAL
Qty: 9 | Refills: 0
Start: 2022-04-23 | End: 2022-04-25

## 2022-04-23 RX ADMIN — Medication 15 MILLIGRAM(S): at 12:14

## 2022-04-23 RX ADMIN — Medication 5 MILLIGRAM(S): at 12:14

## 2022-04-23 RX ADMIN — Medication 975 MILLIGRAM(S): at 12:14

## 2022-04-23 NOTE — ED PROVIDER NOTE - NSDCPRINTRESULTS_ED_ALL_ED
Patient:   JOSE VICTOR            MRN: [id number]            FIN: [id number]              Age:   56 years     Sex:  MALE     :  63   Associated Diagnoses:   None   Author:   FRANCO MARTINS     Subjective   Additional information persistent chest pain chronic  dyspnea on exertion +  s/p RHC  no further complaints.       Health Status   Allergies:    Allergies (15) Active Reaction  Latex Angioedema  codeine Rash, Swelling  nitroglycerin Angioedema  ACE Inhibitors (angiotensin Itching  converting enzyme inhi  Bactrim DS Hives  Contrast Dye Itch  Flagyl Hives  ciprofloxacin Rash  HYDROcodone None Documented  iodine containing compounds Swelling, Itching  IVP dye (iodinated radiocontrast None Documented  dyes)  Levaquin Rash  Toradol Rash  Ultram Itching  Zofran itching    Current medications: Medications (24) Active  Scheduled: (16)  AmLODIPine 5 mg tab  5 mg 1 tab, Oral, Daily  ApixaBAN 2.5 mg tab  2.5 mg 1 tab, Oral, BID  Aspirin 81 mg DR tab  81 mg 1 tab, Oral, Daily  Atorvastatin 80 mg tab  80 mg 1 tab, Oral, Daily  Carvedilol 25 mg tab  25 mg 1 tab, Oral, Q12H  ceFAZolin  2,000 mg 15 mL, Slow IV Push, Pre Op (One Time)  Cholecalciferol 1,000 unit (25 mcg) tab  1,000 unit 1 tab, Oral, Daily  Clopidogrel 75 mg tab  75 mg 1 tab, Oral, Daily  Docusate sodium 100 mg cap  100 mg 1 cap, Oral, Q Bedtime  DULoxetine 20 mg DR cap  20 mg 1 cap, Oral, Daily  Finasteride 5 mg tab  5 mg 1 tab, Oral, Daily  Fluticasone-vilanterol 200-25 mcg inhalation powder 14s  1 puff, MDI/DPI, Daily  insulin glargine  30 unit 0.3 mL, Subcutaneous, Q Evening  Insulin human lispro 1 unit/0.01 mL inj  1-6 units, Subcutaneous, QID [with meals & HS]  Ranolazine 500 mg ER tab  1,000 mg 2 tab, Oral, BID  TACROlimus 1 mg cap  4 mg 4 cap, Oral, Q12H  Continuous: (0)  PRN: (8)  Acetaminophen 325 mg tab  650 mg 2 tab, Oral, Q4H  Albuterol HFA 90 mcg oral MDI 8 gm  180 mcg 2 puff, MDI/DPI, Q6H  Dextrose (glucose) 40% 15 gm/37.5 gm  oral gel UD  15 gm, Oral, As Directed PRN  Dextrose (glucose) 50% 25 gm/50 mL syringe  12.5 gm 25 mL, IV Push, As Directed PRN  DiphenhydrAMINE 25 mg cap  50 mg 2 cap, Oral, Q6H  Glucagon 1 mg/1 mL emergency kit SDV  1 mg 1 mL, IM, As Directed PRN  Oxycodone-acetaminophen 5-325 mg tab  1 tab, Oral, Q4H  Sodium chloride PF 0.9% flush inj 10 mL  2 mL, Flush, As Directed PRN       Objective   Intake and Output   I & O between:  08-DEC-2019 12:14 TO 09-DEC-2019 12:14  Med Dosing Weight:  97  kg   04-DEC-2019  24 Hour Intake:   1080.00  ( 11.13 mL/kg )  24 Hour Output:   850.00           24 Hour Urine/Stool Output:   0.0  24 Hour Balance:   230.00           24 Hour Urine Output:   850.00  ( 0.37 mL/kg/hr )                    Stool Count:  1         VS/Measurements   Vitals between:   08-DEC-2019 12:14:05   TO   09-DEC-2019 12:14:05                   LAST RESULT MINIMUM MAXIMUM  Temperature 36.5 36.5 36.7  Heart Rate 78 63 86  Respiratory Rate 16 10 24  NISBP           124 97 185  NIDBP           73 43 97  NIMBP           90 62 122  SpO2                    98 94 99  FiO2                    0.21 0.21 0.21      General:  Alert and oriented, No acute distress.    HENT:  Oral mucosa is moist, No pharyngeal erythema.    Neck:  Supple, Non-tender, No carotid bruit, No jugular venous distention, No lymphadenopathy, No thyromegaly.   Respiratory:  Lungs are clear to auscultation, Respirations are non-labored, Breath sounds are equal, No chest wall tenderness.   Cardiovascular:  Normal rate, Regular rhythm, No murmur, Normal peripheral perfusion, No edema, life vest +.   Gastrointestinal:  Soft, Non-tender, Non-distended, Normal bowel sounds.   Genitourinary:  No costovertebral angle tenderness.    Musculoskeletal     Normal range of motion.     Normal strength.     No tenderness.     Integumentary:  Warm, Dry, Intact.    Neurologic:  Alert, Oriented, Normal motor function, No focal deficits.   Cognition and Speech:  Oriented,  Functional cognition intact.    Psychiatric:  Cooperative.            Results Review   General results   Interpretation:   Labs between:  08-DEC-2019 12:14 to 09-DEC-2019 12:14  CBC:                 WBC  HgB  Hct  Plt  MCV  RDW   09-DEC-2019 9.9  13.1  42.8  150  82.5  (H) 15.1   DIFF:                 Seg  Neutroph//ABS  Lymph//ABS  Mono//ABS  EOS/ABS  09-DEC-2019 NOT APPLICABLE  68 // 6.7 22 // 2.2 6 // 0.6 3 // 0.3  BMP:                 Na  Cl  BUN  Glu   09-DEC-2019 138  (H) 108  (H) 25  (H) 103                              K  CO2  Cr  Ca                              4.6  24  (H) 1.56  8.7   CMP:                 AST  ALT  AlkPhos  Bili  Albumin   09-DEC-2019 12  29  74  0.3  (L) 3.4   Other Chem:             Mg  Phos  Triglycerides  GGTP  DirectBili                           1.9  4.2         POC GLU:                 Latest Result  Latest Date  Minimum  Min Date  Maximum  Max Date                             (H) 123  09-DEC-2019 (H) 123  09-DEC-2019 (H) 100  08-DEC-2019                             LINES  Peripheral Intravenous Wrist Right   Gauge: 20   Charted: 12/09/19 09:50  Inserted: 12/04/19   Days Since Insertion: 5 days  Indication of Use: Saline Lock          Impression and Plan   chronic stable Angina  History of CABG.  History of multiple stents.  Per patient he has 29 stents in the past  Presents with angina.  Patient states he was at [facility] 3 weeks ago with similar symptoms.   He was sent home on a LifeVest.  Patient endorses that a stress test with subsequent hospital was positive.Cardiology consult  Continue aspirin and statin, plavix  History of coronary artery disease status post CABG status post stents many  continue aspirin statin and beta-blocker and plavix  Chronic systolic heart failure/and ICM  On LifeVest  Continue home medication  s/p RHC--. Mildly elevated filling pressures with low cardiac index  scheduled fro [facility] next week tuesday  d/w Dr. Christine--heart tranplant work up in  progress. to get records from [facility]  chronic pain syndrome from chronic angina  continue percocet PRN  pain managment consult   Type 2 diabetes with CKD stage3 controlled  Continue home insulin lantus   continue sliding-scale  CKD 3  renal on  Renal transplant status continue immunosuppressants  Dyslipidemia continue statin  Moderate obesity/EVITA  Noncompliant with CPAP machine  Chronic atrial fibrillation  Rate controlled  Continue beta-blocker  Continue Eliquis  DVT prophylaxis on eliquis  PUD prophylaxis not indicated  CODE STATUS full code  IMMUMIZATION STATUS  COMMUNICATION: The plan was discussed with the patient/  discussed with [facility] team Dr. Nanci RAMON hospitalist to follow pt from tomorrow   Patient requests all Lab, Cardiology, and Radiology Results on their Discharge Instructions

## 2022-04-23 NOTE — ED ADULT TRIAGE NOTE - CHIEF COMPLAINT QUOTE
pt c/o right sided lower back pain. Pt was seen here yesterday for symptoms and discharged home with negative findings. Denies fever, CP, SOB, urinary symptoms. PMH HTN, spinal ablation

## 2022-04-23 NOTE — ED PROVIDER NOTE - NSFOLLOWUPINSTRUCTIONS_ED_ALL_ED_FT
You were seen in the Emergency Room today because of back pain. Please follow-up with your Primary Care Physician within the week. Be sure to keep your appointment with the [facility].     For mild to moderate pain, you can take Tylenol 650mg and Ibuprofen 400mg together every 6 hours.   You can take oxycodone for severe pain.     We have sent medication to your Pharmacy. It is called Valium. This is a muscle relaxer.       WHAT YOU NEED TO KNOW:  Back pain is common. You may have back pain and muscle spasms. You may feel sore or stiff on one or both sides of your back. The pain may spread to your lower body. Conditions that affect the spine, joints, or muscles can cause back pain. These may include arthritis, spinal stenosis (narrowing of the spinal column), muscle tension, or breakdown of the spinal discs.    DISCHARGE INSTRUCTIONS:  Call your local emergency number (911 in the [place]) if:   •You have severe back pain with chest pain, nausea, vomiting, or that is spreading.   •You cannot control your urine or bowel movements.  •Your pain becomes so severe that you cannot walk.  •You have pain, numbness, or weakness in one or both legs.    Call your primary care doctor if:   •You have back pain that does not get better with rest and pain medicine.  •You have a fever.  •You have pain that worsens when you are on your back or when you rest.  •You have pain that worsens when you cough or sneeze.  •You lose weight without trying.  •You have questions or concerns about your condition or care.    How to manage your back pain:   •Apply ice on your back for 15 to 20 minutes every hour or as directed. Use an ice pack, or put crushed ice in a plastic bag. Cover it with a towel before you apply it to your skin. Ice helps prevent tissue damage and decreases pain.  •Apply heat on your back for 20 to 30 minutes every 2 hours for as many days as directed. Heat helps decrease pain and muscle spasms.  •Stay active as much as you can without causing more pain. Bed rest could make your back pain worse. Avoid heavy lifting until your pain is gone.  •Go to physical therapy as directed. A physical therapist can teach you exercises to help improve movement and strength, and to decrease pain.    Follow up with your doctor as directed: You might need to see a specialist. Write down your questions so you remember to ask them during your visits.

## 2022-04-23 NOTE — ED PROVIDER NOTE - CLINICAL SUMMARY MEDICAL DECISION MAKING FREE TEXT BOX
Grayson, PGY1 - back pain with no neurological symptoms, muscular tenderness that is reproduced, meds, reassess. May prescribe muscle relaxants for home. *The above represents an initial assessment/impression. Please refer to progress notes for potential changes in patient clinical course*

## 2022-04-23 NOTE — ED PROVIDER NOTE - MUSCULOSKELETAL, MLM
Spine appears normal, range of motion is not limited, no muscle or joint tenderness. No midline tenderness or fluctuance. 5/5 b/l LE.

## 2022-04-23 NOTE — ED PROVIDER NOTE - NS ED ROS FT
GENERAL: No fever, no chills  EYES: No change in vision  HEENT: No trouble swallowing or speaking  CARDIAC: No chest pain  PULMONARY: No cough, no SOB  GI: No abdominal pain, no nausea or no vomiting, no diarrhea, no constipation  : No changes in urination  SKIN: No rashes  NEURO: No headache, no numbness  MSK: No joint pain, +back pain   Otherwise as HPI or negative.

## 2022-04-23 NOTE — ED PROVIDER NOTE - PATIENT PORTAL LINK FT
You can access the FollowMyHealth Patient Portal offered by [facility] by registering at the following website: [email or web address]. By joining [employer]’s FollowMyHealth portal, you will also be able to view your health information using other applications (apps) compatible with our system.

## 2022-04-23 NOTE — ED PROVIDER NOTE - ATTENDING CONTRIBUTION TO CARE
Pt was seen and evaluated by me. Pt is a 67 y/o female with PMHx of multiple herniated disks, Fibromyalgia, HLD, HTN, and Hypothyroidism who presented to the ED for right low back pain X 1 day. Pt states he was having low back pain and was seen yesterday and treated with medication at which time she felt better. Pt states she woke up this morning with return of pain and took 1 Percocet with minimal relief. Pt denies any new trauma or falls. Pt denies any loss of bowel or bladder function. Pt denies any LE numbness or weakness. Pt denies any fever, chills, nausea, vomiting, SOB, chest pain, or abd pain.   VITALS: Vitals have been reviewed.  GEN APPEARANCE: WDWN, alert and cooperative, non-toxic appearing and in NAD  HEAD: Atraumatic, normocephalic.   EYES: PERRL, EOMI.   EARS: Gross hearing intact.   NOSE: No nasal discharge.   THROAT: MMM. Oral cavity and pharynx normal.   NECK: Supple  CV: RRR, S1S2, no c/r/m/g. No cyanosis or pallor. Extremities warm, well perfused. Cap refill <2 seconds. No bruits.   LUNGS: CTAB. No wheezing. No rales. No rhonchi. No diminished breath sounds.   ABDOMEN: Soft, NTND. No guarding or rebound. No masses.   MSK/EXT: Spine appears normal, no spine point tenderness. No swelling or fluctuance. 5/5 b/l LE. Increased pain with right left raise.   Pelvis: Stable. No obvious joint or bony deformity, no peripheral edema.   NEURO: Alert, follows commands. Speech normal. Sensation and motor normal x4 extremities.   SKIN: Normal color for race, warm, dry and intact. No evidence of rash.  PSYCH: Normal mood and affect.  67 y/o female with PMHx of multiple herniated disks, Fibromyalgia, HLD, HTN, and Hypothyroidism who presented to the ED for right low back pain X 1 day.   Concern for back strain   Analgesia

## 2022-04-23 NOTE — ED PROVIDER NOTE - PHYSICAL EXAMINATION
Gen: NAD, AOx3, able to make needs known, non-toxic  Head: NCAT  HEENT: EOMI, normal conjunctiva  Lung: no respiratory distress, speaking in full sentences  CV: pulses bilaterally   Abd: soft, NTND, no guarding  MSK: no visible bony deformities, tender to palpation of the right lower back, no spinal tenderness   Neuro: No focal sensory or motor deficits  Skin: Warm, well perfused, no rash  Psych: normal affect

## 2022-04-23 NOTE — ED PROVIDER NOTE - PROGRESS NOTE DETAILS
Grayson, PGY1 - Pain much improved, sending valium to pharmacy. Patient stable for discharge. Understands the Emergency Room work-up and discharge precautions. Will follow-up with pcp and spine center

## 2022-04-23 NOTE — ED ADULT NURSE NOTE - OBJECTIVE STATEMENT
67y/o female A&ox4, ambulatory received in int10c. pt c/o R sided lower back pain, was seen in ED yesterday for eval, arrives today for worsening discomfort. pmhx spinal ablation, epidural, fibromyalgia. pt able to ambulate, rom noted in R leg, no ecchymosis, redness noted. pt uncomfortable at this time. MD at bedside for eval. medicated as per MD orders. bed in lowest position, side rails up, call bell in hand, safety maintained. awaiting further orders. will continue to monitor.

## 2022-04-23 NOTE — ED PROVIDER NOTE - OBJECTIVE STATEMENT
67yo woman with PMH multiple herniated disks, discharged yesterday for right lower back pain, presenting for the same complaint. Patient states that this morning woke up with increased amount of pain. Used two lidocaine patches, took 1 oxycodone pill that she was prescribed yesterday, and since symptoms did not resolve, decided to come to ER. Spine Center has already been notified. Denies numbness, tingling, groin numbness, urinary/fecal incontinence, fevers, chills.

## 2023-03-19 NOTE — ED PROVIDER NOTE - NSDCPRINTRESULTS_ED_ALL_ED
Attending Attestation (For Attendings USE Only)...
Patient requests all Lab and Radiology Results on their Discharge Instructions

## 2023-03-27 ENCOUNTER — OUTPATIENT (OUTPATIENT)
Dept: OUTPATIENT SERVICES | Facility: HOSPITAL | Age: 68
LOS: 1 days | End: 2023-03-27
Payer: MEDICARE

## 2023-03-27 VITALS
TEMPERATURE: 98 F | HEART RATE: 96 BPM | HEIGHT: 64 IN | OXYGEN SATURATION: 99 % | SYSTOLIC BLOOD PRESSURE: 166 MMHG | DIASTOLIC BLOOD PRESSURE: 91 MMHG | WEIGHT: 233.03 LBS | RESPIRATION RATE: 17 BRPM

## 2023-03-27 DIAGNOSIS — Z96.659 PRESENCE OF UNSPECIFIED ARTIFICIAL KNEE JOINT: Chronic | ICD-10-CM

## 2023-03-27 DIAGNOSIS — Z01.818 ENCOUNTER FOR OTHER PREPROCEDURAL EXAMINATION: ICD-10-CM

## 2023-03-27 DIAGNOSIS — D48.1 NEOPLASM OF UNCERTAIN BEHAVIOR OF CONNECTIVE AND OTHER SOFT TISSUE: ICD-10-CM

## 2023-03-27 DIAGNOSIS — E89.0 POSTPROCEDURAL HYPOTHYROIDISM: Chronic | ICD-10-CM

## 2023-03-27 PROCEDURE — G0463: CPT

## 2023-03-27 RX ORDER — FLUTICASONE PROPIONATE 220 MCG
1 AEROSOL WITH ADAPTER (GRAM) INHALATION
Refills: 0 | DISCHARGE

## 2023-03-27 NOTE — H&P PST ADULT - ASSESSMENT
This 66 yo f significant PMHX HTN, HLD, GERD, OA, hx Thyroid ca presents to PST for a scheduled excision of right upper arm mass with Dr. Gould on 4/4/23.

## 2023-03-27 NOTE — H&P PST ADULT - HISTORY OF PRESENT ILLNESS
This 66 yo f significant PMHX HTN, HLD, GERD, OA, hx Thyroid ca presents to PST for a scheduled excision of right upper arm mass with Dr. Gould on 4/4/23. Pt is electing to have this procedure.

## 2023-03-27 NOTE — H&P PST ADULT - RESPIRATORY
What Type Of Note Output Would You Prefer (Optional)?: Bullet Format Hpi Title: Evaluation of Skin Lesions How Severe Are Your Spot(S)?: mild Have Your Spot(S) Been Treated In The Past?: has not been treated normal/clear to auscultation bilaterally/no wheezes/no rales/no rhonchi

## 2023-03-27 NOTE — H&P PST ADULT - NSANTHOSAYNRD_GEN_A_CORE
No. ROCHELLE screening performed.  STOP BANG Legend: 0-2 = LOW Risk; 3-4 = INTERMEDIATE Risk; 5-8 = HIGH Risk Yes

## 2023-03-27 NOTE — H&P PST ADULT - NSICDXPASTMEDICALHX_GEN_ALL_CORE_FT
PAST MEDICAL HISTORY:  Fibromyalgia     HLD (hyperlipidemia)     HTN (hypertension)     Hypothyroidism     Thyroid cancer      PAST MEDICAL HISTORY:  Fibromyalgia     History of TMJ disorder     HLD (hyperlipidemia)     HTN (hypertension)     Hypothyroidism     Thyroid cancer

## 2023-03-27 NOTE — H&P PST ADULT - NSICDXPASTSURGICALHX_GEN_ALL_CORE_FT
PAST SURGICAL HISTORY:  H/O partial thyroidectomy     Knee joint replacement status, unspecified laterality

## 2023-03-27 NOTE — H&P PST ADULT - PROBLEM SELECTOR PLAN 1
labs & EKG done at PCP   Medical evaluation with Dr. Russo  All preoperative instructions including CHD cleanse reviewed with patient who verbalized understanding. Avoid all NSAID/ASA containing products as well as all herbal/vitamin supplements. Tylenol only for pain/headache. Fully vaccinated; Denies recent travel, recent illness and sick contact with COVID in the last 3 weeks

## 2023-04-02 RX ORDER — SODIUM CHLORIDE 9 MG/ML
1000 INJECTION, SOLUTION INTRAVENOUS
Refills: 0 | Status: DISCONTINUED | OUTPATIENT
Start: 2023-04-04 | End: 2023-04-04

## 2023-04-03 ENCOUNTER — TRANSCRIPTION ENCOUNTER (OUTPATIENT)
Age: 68
End: 2023-04-03

## 2023-04-04 ENCOUNTER — TRANSCRIPTION ENCOUNTER (OUTPATIENT)
Age: 68
End: 2023-04-04

## 2023-04-04 ENCOUNTER — OUTPATIENT (OUTPATIENT)
Dept: OUTPATIENT SERVICES | Facility: HOSPITAL | Age: 68
LOS: 1 days | End: 2023-04-04
Payer: MEDICARE

## 2023-04-04 ENCOUNTER — RESULT REVIEW (OUTPATIENT)
Age: 68
End: 2023-04-04

## 2023-04-04 VITALS
OXYGEN SATURATION: 95 % | DIASTOLIC BLOOD PRESSURE: 61 MMHG | HEART RATE: 82 BPM | RESPIRATION RATE: 14 BRPM | SYSTOLIC BLOOD PRESSURE: 116 MMHG

## 2023-04-04 VITALS
DIASTOLIC BLOOD PRESSURE: 71 MMHG | OXYGEN SATURATION: 96 % | HEART RATE: 94 BPM | HEIGHT: 64 IN | TEMPERATURE: 98 F | RESPIRATION RATE: 16 BRPM | SYSTOLIC BLOOD PRESSURE: 127 MMHG | WEIGHT: 233.03 LBS

## 2023-04-04 DIAGNOSIS — D48.1 NEOPLASM OF UNCERTAIN BEHAVIOR OF CONNECTIVE AND OTHER SOFT TISSUE: ICD-10-CM

## 2023-04-04 DIAGNOSIS — E89.0 POSTPROCEDURAL HYPOTHYROIDISM: Chronic | ICD-10-CM

## 2023-04-04 DIAGNOSIS — Z96.659 PRESENCE OF UNSPECIFIED ARTIFICIAL KNEE JOINT: Chronic | ICD-10-CM

## 2023-04-04 PROCEDURE — 24073 EX ARM/ELBOW TUM DEEP 5 CM/>: CPT | Mod: RT

## 2023-04-04 PROCEDURE — 88304 TISSUE EXAM BY PATHOLOGIST: CPT | Mod: 26

## 2023-04-04 PROCEDURE — 88304 TISSUE EXAM BY PATHOLOGIST: CPT

## 2023-04-04 RX ORDER — ONDANSETRON 8 MG/1
4 TABLET, FILM COATED ORAL ONCE
Refills: 0 | Status: DISCONTINUED | OUTPATIENT
Start: 2023-04-04 | End: 2023-04-04

## 2023-04-04 RX ORDER — AMLODIPINE BESYLATE 2.5 MG/1
1 TABLET ORAL
Refills: 0 | DISCHARGE

## 2023-04-04 RX ORDER — HYDROCHLOROTHIAZIDE 25 MG
1 TABLET ORAL
Refills: 0 | DISCHARGE

## 2023-04-04 RX ORDER — OXYCODONE HYDROCHLORIDE 5 MG/1
5 TABLET ORAL ONCE
Refills: 0 | Status: DISCONTINUED | OUTPATIENT
Start: 2023-04-04 | End: 2023-04-04

## 2023-04-04 RX ORDER — LISINOPRIL 2.5 MG/1
1 TABLET ORAL
Refills: 0 | DISCHARGE

## 2023-04-04 RX ORDER — FLUTICASONE PROPIONATE 50 MCG
1 SPRAY, SUSPENSION NASAL
Refills: 0 | DISCHARGE

## 2023-04-04 RX ORDER — ATORVASTATIN CALCIUM 80 MG/1
1 TABLET, FILM COATED ORAL
Refills: 0 | DISCHARGE

## 2023-04-04 RX ORDER — GABAPENTIN 400 MG/1
1 CAPSULE ORAL
Refills: 0 | DISCHARGE

## 2023-04-04 RX ORDER — HYDROMORPHONE HYDROCHLORIDE 2 MG/ML
0.5 INJECTION INTRAMUSCULAR; INTRAVENOUS; SUBCUTANEOUS
Refills: 0 | Status: DISCONTINUED | OUTPATIENT
Start: 2023-04-04 | End: 2023-04-04

## 2023-04-04 RX ORDER — CEFAZOLIN SODIUM 1 G
2000 VIAL (EA) INJECTION ONCE
Refills: 0 | Status: COMPLETED | OUTPATIENT
Start: 2023-04-04 | End: 2023-04-04

## 2023-04-04 RX ORDER — LEVOTHYROXINE SODIUM 125 MCG
1 TABLET ORAL
Refills: 0 | DISCHARGE

## 2023-04-04 RX ORDER — SODIUM CHLORIDE 9 MG/ML
1000 INJECTION, SOLUTION INTRAVENOUS
Refills: 0 | Status: DISCONTINUED | OUTPATIENT
Start: 2023-04-04 | End: 2023-04-04

## 2023-04-04 RX ADMIN — HYDROMORPHONE HYDROCHLORIDE 0.5 MILLIGRAM(S): 2 INJECTION INTRAMUSCULAR; INTRAVENOUS; SUBCUTANEOUS at 15:00

## 2023-04-04 RX ADMIN — SODIUM CHLORIDE 75 MILLILITER(S): 9 INJECTION, SOLUTION INTRAVENOUS at 13:46

## 2023-04-04 RX ADMIN — SODIUM CHLORIDE 75 MILLILITER(S): 9 INJECTION, SOLUTION INTRAVENOUS at 14:42

## 2023-04-04 RX ADMIN — HYDROMORPHONE HYDROCHLORIDE 0.5 MILLIGRAM(S): 2 INJECTION INTRAMUSCULAR; INTRAVENOUS; SUBCUTANEOUS at 14:42

## 2023-04-04 NOTE — ASU DISCHARGE PLAN (ADULT/PEDIATRIC) - CARE PROVIDER_API CALL
Lee Gould (MD)  Surgery  [address] [place] [address]  Phone: [phone]  Fax: [phone]  Follow Up Time:

## 2023-04-04 NOTE — ASU DISCHARGE PLAN (ADULT/PEDIATRIC) - ASU DC SPECIAL INSTRUCTIONSFT
Keep dermabond (glue) clean, dry and intact x 24 hrs. Apply water proof ice pack 20 mins on, 20 mins off to help decrease pain and swelling. After 24 hrs, you may begin showering as usual but Do NOT remove glue. Do not scrub or soak incision site. Pat dry. NO tub baths, NO swimming pools. No hot tubs.    You may take Ibuprofen 400 mg every 4-6 hours for mild pain - take with food to prevent GI upset.  You may take Tylenol two 500 mg tablets every 6 hours for moderate pain. Do not exceed 4000 mg per 24 hours.    Please follow up with Dr. Gould in one week - call the office to make an appointment if you do not already have one.

## 2023-04-04 NOTE — ASU DISCHARGE PLAN (ADULT/PEDIATRIC) - NS MD DC FALL RISK RISK
For information on Fall & Injury Prevention, visit: [email or web address] OR  [email or web address] OR  https://www.cdc.gov/steadi/patient.html

## 2023-04-04 NOTE — ASU PREOP CHECKLIST - SELECT TESTS ORDERED
Called the care manager to give resources to for extra help and care.   
PO Hydralazine that was prescribed by ED physician was not sent by pharmacy after RN requested and called twice for it  
A1C/CBC/CMP/Urinalysis/EKG

## 2023-04-04 NOTE — BRIEF OPERATIVE NOTE - NSICDXBRIEFPROCEDURE_GEN_ALL_CORE_FT
PROCEDURES:  Excision, soft tissue tumor, upper arm, subfascial, 5 cm or greater 04-Apr-2023 14:32:25  Lee Gould

## 2023-04-11 LAB — SURGICAL PATHOLOGY STUDY: SIGNIFICANT CHANGE UP

## 2023-11-16 NOTE — ED CDU PROVIDER INITIAL DAY NOTE - DATE/TIME 1
must follow with heme in 1 week  and pain mngmt  13-Aug-2019 10:23 must follow with heme in 1 week and also neurology in 1 week  and pain mngmt

## 2024-07-22 ENCOUNTER — EMERGENCY (EMERGENCY)
Facility: HOSPITAL | Age: 69
LOS: 1 days | Discharge: HOME | End: 2024-07-22
Attending: STUDENT IN AN ORGANIZED HEALTH CARE EDUCATION/TRAINING PROGRAM | Admitting: STUDENT IN AN ORGANIZED HEALTH CARE EDUCATION/TRAINING PROGRAM
Payer: MEDICARE

## 2024-07-22 VITALS
HEART RATE: 85 BPM | RESPIRATION RATE: 18 BRPM | TEMPERATURE: 98 F | DIASTOLIC BLOOD PRESSURE: 66 MMHG | SYSTOLIC BLOOD PRESSURE: 130 MMHG | WEIGHT: 220.46 LBS | OXYGEN SATURATION: 99 % | HEIGHT: 66 IN

## 2024-07-22 VITALS
OXYGEN SATURATION: 99 % | RESPIRATION RATE: 16 BRPM | TEMPERATURE: 98 F | SYSTOLIC BLOOD PRESSURE: 127 MMHG | HEART RATE: 63 BPM | DIASTOLIC BLOOD PRESSURE: 81 MMHG

## 2024-07-22 DIAGNOSIS — Z96.659 PRESENCE OF UNSPECIFIED ARTIFICIAL KNEE JOINT: Chronic | ICD-10-CM

## 2024-07-22 DIAGNOSIS — E89.0 POSTPROCEDURAL HYPOTHYROIDISM: Chronic | ICD-10-CM

## 2024-07-22 PROBLEM — Z87.39 PERSONAL HISTORY OF OTHER DISEASES OF THE MUSCULOSKELETAL SYSTEM AND CONNECTIVE TISSUE: Chronic | Status: ACTIVE | Noted: 2023-03-27

## 2024-07-22 PROCEDURE — 99284 EMERGENCY DEPT VISIT MOD MDM: CPT

## 2024-07-22 RX ORDER — METHOCARBAMOL 500 MG
2 TABLET ORAL
Qty: 42 | Refills: 0
Start: 2024-07-22 | End: 2024-07-28

## 2024-07-22 RX ORDER — NAPROXEN SODIUM 550 MG
1 TABLET ORAL
Qty: 14 | Refills: 0
Start: 2024-07-22 | End: 2024-07-28

## 2024-07-22 RX ORDER — KETOROLAC TROMETHAMINE 30 MG/ML
30 INJECTION, SOLUTION INTRAMUSCULAR ONCE
Refills: 0 | Status: DISCONTINUED | OUTPATIENT
Start: 2024-07-22 | End: 2024-07-22

## 2024-07-22 RX ORDER — METHOCARBAMOL 500 MG
1500 TABLET ORAL ONCE
Refills: 0 | Status: COMPLETED | OUTPATIENT
Start: 2024-07-22 | End: 2024-07-22

## 2024-07-22 RX ORDER — DIAZEPAM 10 MG/1
5 TABLET ORAL ONCE
Refills: 0 | Status: DISCONTINUED | OUTPATIENT
Start: 2024-07-22 | End: 2024-07-22

## 2024-07-22 RX ORDER — ACETAMINOPHEN 325 MG
975 TABLET ORAL ONCE
Refills: 0 | Status: COMPLETED | OUTPATIENT
Start: 2024-07-22 | End: 2024-07-22

## 2024-07-22 RX ORDER — LIDOCAINE HCL 28 MG/G
1 GEL TOPICAL ONCE
Refills: 0 | Status: COMPLETED | OUTPATIENT
Start: 2024-07-22 | End: 2024-07-22

## 2024-07-22 RX ADMIN — Medication 1500 MILLIGRAM(S): at 12:54

## 2024-07-22 RX ADMIN — Medication 975 MILLIGRAM(S): at 11:39

## 2024-07-22 RX ADMIN — DIAZEPAM 5 MILLIGRAM(S): 10 TABLET ORAL at 11:40

## 2024-07-22 RX ADMIN — KETOROLAC TROMETHAMINE 30 MILLIGRAM(S): 30 INJECTION, SOLUTION INTRAMUSCULAR at 11:40

## 2024-07-22 RX ADMIN — LIDOCAINE HCL 1 PATCH: 28 GEL TOPICAL at 11:39

## 2024-07-22 NOTE — ED PROVIDER NOTE - PROGRESS NOTE DETAILS
Magdaleno Gonzales, EM NP Fellow: Patient reports some improvement of pain after medications. Patient is ready for discharge home. Vital signs reviewed and hemodynamically stable. Patient provided time to ask questions.  All questions were answered at bedside with reasons to return explained at length. Will place order for referral coordinator to call and assist patient with obtaining appointment with new spine specialist and will provide referral list as well. Patient agreeable to plan.

## 2024-07-22 NOTE — ED PROVIDER NOTE - NSFOLLOWUPINSTRUCTIONS_ED_ALL_ED_FT
- You were seen in the ER today for back pain and spasms.    - You were given Toradol, Tylenol, Lidocaine patch, and Valium, a muscle relaxer.     - A prescription was sent to your pharmacy for Valium, a muscle relaxer, you can take this every 8 hours as needed for pain or muscle spams. DO NOT drive after taking this medication as it can cause drowsiness, DO NOT drink alcohol while taking this medication as it can exacerbate the effects such as drowsiness.     - A prescription was also sent for Naproxen, an NSAID, you can take this two times daily for pain.     - You can use over the counter Lidocaine patches for pain, these can stay ON for 12 hours, then must come OFF for 12 hours. You can apply one every 24 hours as needed for pain.     - Please follow up with the spine specialist, a contact number will be provided below.     - Patient return to the ER for severe or worsening pain, bowel or bladder control problems, unusual weakness or numbness in your arms or legs, inability to ambulate, nausea or vomiting, abdominal pain, fever, dizziness/lightheadedness, or any other concerns.       Back Pain    Back pain is very common in adults. The cause of back pain is rarely dangerous and the pain often gets better over time. The cause of your back pain may not be known and may include strain of muscles or ligaments, degeneration of the spinal disks, or arthritis. Occasionally the pain may radiate down your leg(s). Over-the-counter medicines to reduce pain and inflammation are often the most helpful. Stretching and remaining active frequently helps the healing process. - You were seen in the ER today for back pain and spasms.    - You were given Toradol, Tylenol, Lidocaine patch, and Valium, a muscle relaxer.     - A prescription was sent to your pharmacy for Methocarbamol (Robaxin), a muscle relaxer, you can take this every 8 hours as needed for pain or muscle spams. DO NOT drive after taking this medication as it can cause drowsiness, DO NOT drink alcohol while taking this medication as it can exacerbate the effects such as drowsiness.     - A prescription was also sent for Naproxen, an NSAID, you can take this two times daily for pain.     - You can use over the counter Lidocaine patches for pain, these can stay ON for 12 hours, then must come OFF for 12 hours. You can apply one every 24 hours as needed for pain.     - Please follow up with the spine specialist, a contact number will be provided below.     - Patient return to the ER for severe or worsening pain, bowel or bladder control problems, unusual weakness or numbness in your arms or legs, inability to ambulate, nausea or vomiting, abdominal pain, fever, dizziness/lightheadedness, or any other concerns.       Back Pain    Back pain is very common in adults. The cause of back pain is rarely dangerous and the pain often gets better over time. The cause of your back pain may not be known and may include strain of muscles or ligaments, degeneration of the spinal disks, or arthritis. Occasionally the pain may radiate down your leg(s). Over-the-counter medicines to reduce pain and inflammation are often the most helpful. Stretching and remaining active frequently helps the healing process. - You were seen in the ER today for back pain and spasms.    - You were given Toradol, Tylenol, Lidocaine patch, and Valium, a muscle relaxer.     - A prescription was sent to your pharmacy for Methocarbamol (Robaxin), a muscle relaxer, you can take this every 8 hours as needed for pain or muscle spams. DO NOT drive after taking this medication as it can cause drowsiness, DO NOT drink alcohol while taking this medication as it can exacerbate the effects such as drowsiness.     - A prescription was also sent for Naproxen, an NSAID, you can take this two times daily for pain.     - You can use over the counter Lidocaine patches for pain, these can stay ON for 12 hours, then must come OFF for 12 hours. You can apply one every 24 hours as needed for pain.     - Please follow up with the spine specialist, a contact number will be provided below. A referral coordinator will call you in the next 2-3 business days to assist you in obtaining a follow up appointment.     - Patient return to the ER for severe or worsening pain, bowel or bladder control problems, unusual weakness or numbness in your arms or legs, inability to ambulate, nausea or vomiting, abdominal pain, fever, dizziness/lightheadedness, or any other concerns.       Back Pain    Back pain is very common in adults. The cause of back pain is rarely dangerous and the pain often gets better over time. The cause of your back pain may not be known and may include strain of muscles or ligaments, degeneration of the spinal disks, or arthritis. Occasionally the pain may radiate down your leg(s). Over-the-counter medicines to reduce pain and inflammation are often the most helpful. Stretching and remaining active frequently helps the healing process.

## 2024-07-22 NOTE — ED PROVIDER NOTE - PATIENT PORTAL LINK FT
You can access the FollowMyHealth Patient Portal offered by [facility] by registering at the following website: [email or web address]. By joining [facility]’s FollowMyHealth portal, you will also be able to view your health information using other applications (apps) compatible with our system.

## 2024-07-22 NOTE — ED PROVIDER NOTE - PHYSICAL EXAMINATION
CONSTITUTIONAL: Alert and Oriented x3, NAD, well groomed  HEAD: Normocephalic, atraumatic.  NECK:  Airway patent. Neck Supple. FROM without pain.   CARDIAC: Heart regular, normal S1/2, no murmurs noted. No chest wall tenderness or deformity.  RESPIRATORY: Lung sounds clear B/L, good aeration. No wheezing, rales, adventitious breath sounds. No accessory muscle use.    ABDOMEN: soft, non-distended; non-tender to palpation, bowel sounds present x4 quadrants, no masses, scars. No CVA tenderness.  PERIPHERAL VASCULAR: No edema of feet and ankles. No calf tenderness. Pulses normal bilaterally.   MSK: Moving all extremities. No deformities or swelling. Normal color and temperature. Full ROM and Strength 5+/5+ B/L upper and lower extremities. + cervical, thoracic, and lumbar paraspinal tenderness. NO spinal tenderness.   SKIN: Warm, dry, color WNL, no turgor, erythema or rashes. Capillary refill < 2 seconds.   NEURO: alert and interactive, cooperative, pleasant, oriented x3, no focal deficits. No paresthesias.

## 2024-07-22 NOTE — ED PROVIDER NOTE - IV ALTEPLASE ADMIN OUTSIDE HIDDEN
show Detail Level: Zone Continue Regimen: Triamcinolone 0.1% cream BID as needed - no refills needed at this time Render In Strict Bullet Format?: No Plan: -patient stated he used cream on face in June and did not get much of a reaction Continue Regimen: Fluorouracil 5% cream in fall/winter months

## 2024-07-22 NOTE — ED PROVIDER NOTE - NSPTACCESSSVCSAPPTDETAILS_ED_ALL_ED_FT
spine specialist. Patient with chronic back pain for many years. wants to find new spine doctor for management of her care.

## 2024-07-22 NOTE — ED ADULT TRIAGE NOTE - CHIEF COMPLAINT QUOTE
no pt with chronic back pain for few weeks, pain increased for past few days, pt denies any trauma, ambulatory, denies dysuria

## 2024-07-22 NOTE — ED ADULT NURSE NOTE - CHIEF COMPLAINT QUOTE
pt with chronic back pain for few weeks, pain increased for past few days, pt denies any trauma, ambulatory, denies dysuria

## 2024-07-22 NOTE — ED PROVIDER NOTE - OBJECTIVE STATEMENT
Patient is 68yo F PMHx chronic back pain, HTN, HLD, hypothyroid, fibromyalgia presents with c/o chronic generalized back pain, worse x3 days. Patient reports she has a pain management doctor which she has not seen in > 1 year 2/2 "the pain medication he gives me never helps." Patient reports she has had multiple "spinal ablations". Patient reports her spine doctor wants her to get a spinal stimulator placed but the patient has been refusing. Patient reports she has been taking tramadol for the pain with no relief. Patient reports she has not tried a muscle relaxer. Patient reports she pain comes on in sharp spasms. Patient reports she has not slept in 3 nights 2./2 pain that is why she came to the ER today. Patient denies recent trauma or injury, fevers, chills, CP, SOB, palpitations, abdominal pain, dysuria, leg pain, bowel or bladder incontinence or retention, numbness, tingling, weakness, difficulty ambulating.

## 2024-07-22 NOTE — ED PROVIDER NOTE - ATTENDING APP SHARED VISIT CONTRIBUTION OF CARE
I have personally performed a face to face medical and diagnostic evaluation of the patient. I have discussed with and reviewed the KATHARINE's note and agree with the History, ROS, Physical Exam and MDM unless otherwise indicated. A brief summary of my personal evaluation and impression can be found below. I actively participated in the comanagement of this patient with the KATHARINE. I have personally reviewed all orders, study/imaging results, medication orders. I discussed indications for consultant evaluation and consultant recommendations with the KATHARINE when applicable, and have discussed the disposition plan with the KATHARINE.    Aaron ODONNELL: 69-year-old female history of high blood pressure hypothyroidism, chronic diffuse back pain, follows with pain management, presents with a chief complaint of diffuse back pain worse to the lower back that is consistent with her prior flares up of her chronic back pain.  She reports this is very consistent with prior episodes.  She can move everything and feel everything.  No fever.  No bowel or bladder retention or incontinence.  No chest pain no trouble breathing no abdominal pain no urinary or bowel complaints.  No recent trauma falls car accidents.    All other ROS negative, except as above and as per HPI and ROS section.    VITALS: Initial triage and subsequent vitals have been reviewed by me.  GEN APPEARANCE: Alert, non-toxic, well-appearing, NAD.  HEAD: Atraumatic.  EYES: PERRLa, EOMI, vision grossly intact.   NECK: Supple  CV: RRR, S1S2, no c/r/m/g. Cap refill <2 seconds. No bruits.   LUNGS: CTAB. No abnormal breath sounds.  ABDOMEN: Soft, NTND. No guarding or rebound.   MSK/EXT: No spinal or extremity point tenderness.  diffuse para spinal tenderness, no saddle anesthesia. No CVA ttp. Pelvis stable. No peripheral edema.  NEURO: Alert, follows commands. Weight bearing normal. Speech normal. Sensation and motor normal x4 extremities.   SKIN: Warm, dry and intact. No rash.  PSYCH: Appropriate    Plan/MDM: exam vss non toxic PE as above ddx c/f likely msk sprain/strain spasm, presentation not c/w  cord pathology, intraabdominal surgical pathology or kidney stone, will give pain control, relaxers, additional meds as needed, reassess, likely dc w spine/ortho follow up.

## 2024-07-22 NOTE — ED ADULT NURSE NOTE - NSFALLUNIVINTERV_ED_ALL_ED
Bed/Stretcher in lowest position, wheels locked, appropriate side rails in place/Call bell, personal items and telephone in reach/Instruct patient to call for assistance before getting out of bed/chair/stretcher/Non-slip footwear applied when patient is off stretcher/[place] to call system/Physically safe environment - no spills, clutter or unnecessary equipment/Purposeful proactive rounding/Room/bathroom lighting operational, light cord in reach

## 2024-07-22 NOTE — ED PROVIDER NOTE - CLINICAL SUMMARY MEDICAL DECISION MAKING FREE TEXT BOX
CRISTY Heaton NP Fellow: Patient is 68yo F PMHx chronic back pain, HTN, HLD, hypothyroid, fibromyalgia presents with c/o chronic generalized back pain, worse x3 days. Patient denies recent trauma or injury, fevers, chills, CP, SOB, palpitations, abdominal pain, dysuria, leg pain, bowel or bladder incontinence or retention, numbness, tingling, weakness, difficulty ambulating. Patient is nontoxic appearing. Physical exam pertinent for + cervical, thoracic, and lumbar paraspinal tenderness. NO spinal tenderness.  Full ROM and Strength 5+/5+ B/L upper and lower extremities. Patient likely with back pain 2/2 muscle spasm. Less concern for acute spinal pathology in the absence of spinal tenderness, no red flag signs including paresthesias, weakness, bowel or bladder incontinence or retention. No need for imaging at this time. Will order IM Toradol, oral Tylenol, Lidocaine patch, and oral Valium for pain and reassess. Patient likely to be discharged home with follow up with her spine doctor and pain management doctor. Patient agreeable to plan.
acuteness of illness

## 2024-07-22 NOTE — ED ADULT NURSE REASSESSMENT NOTE - NS ED NURSE REASSESS COMMENT FT1
Patient received to results waiting room 1; Patient awake and resting in stretcher; respirations even and unlabored, no signs/symptoms of acute distress. Patient endorsing back pain at this time. Safety measures in place, medications administered per eMAR, will provide care as needed. Steady gait observed.

## 2024-07-22 NOTE — ED ADULT NURSE NOTE - OBJECTIVE STATEMENT
Pt received to intake 6  , awake and alert, A&OX4, ambulatory. C/o chronic lower back pain. pt states this occurs every 9-10 months , with no warnings. pt deneis falls or injury to the back. pt able to move upper and lower extremities .  Respirations even and unlabored. Denies CP, SOB, N/V, HA, dizziness, palpitations, blurry vision. pt medicated as per MD orders. Bed in lowest position, call bell within reach. Safety maintained.

## 2024-07-23 ENCOUNTER — NON-APPOINTMENT (OUTPATIENT)
Age: 69
End: 2024-07-23

## 2024-07-23 ENCOUNTER — EMERGENCY (EMERGENCY)
Facility: HOSPITAL | Age: 69
LOS: 1 days | Discharge: HOME | End: 2024-07-23
Attending: EMERGENCY MEDICINE | Admitting: EMERGENCY MEDICINE
Payer: MEDICARE

## 2024-07-23 VITALS
SYSTOLIC BLOOD PRESSURE: 109 MMHG | OXYGEN SATURATION: 96 % | TEMPERATURE: 98 F | HEART RATE: 83 BPM | RESPIRATION RATE: 18 BRPM | HEIGHT: 66 IN | DIASTOLIC BLOOD PRESSURE: 75 MMHG

## 2024-07-23 VITALS
SYSTOLIC BLOOD PRESSURE: 165 MMHG | OXYGEN SATURATION: 97 % | HEART RATE: 84 BPM | RESPIRATION RATE: 18 BRPM | DIASTOLIC BLOOD PRESSURE: 74 MMHG

## 2024-07-23 DIAGNOSIS — E89.0 POSTPROCEDURAL HYPOTHYROIDISM: Chronic | ICD-10-CM

## 2024-07-23 DIAGNOSIS — Z96.659 PRESENCE OF UNSPECIFIED ARTIFICIAL KNEE JOINT: Chronic | ICD-10-CM

## 2024-07-23 PROCEDURE — 99053 MED SERV 10PM-8AM 24 HR FAC: CPT

## 2024-07-23 PROCEDURE — 99284 EMERGENCY DEPT VISIT MOD MDM: CPT

## 2024-07-23 RX ORDER — DIAZEPAM 10 MG/1
10 TABLET ORAL ONCE
Refills: 0 | Status: DISCONTINUED | OUTPATIENT
Start: 2024-07-23 | End: 2024-07-23

## 2024-07-23 RX ORDER — ACETAMINOPHEN 325 MG
975 TABLET ORAL ONCE
Refills: 0 | Status: COMPLETED | OUTPATIENT
Start: 2024-07-23 | End: 2024-07-23

## 2024-07-23 RX ORDER — DIAZEPAM 10 MG/1
1 TABLET ORAL
Qty: 9 | Refills: 0
Start: 2024-07-23 | End: 2024-07-25

## 2024-07-23 RX ORDER — LIDOCAINE HCL 28 MG/G
1 GEL TOPICAL ONCE
Refills: 0 | Status: COMPLETED | OUTPATIENT
Start: 2024-07-23 | End: 2024-07-23

## 2024-07-23 RX ORDER — KETOROLAC TROMETHAMINE 30 MG/ML
15 INJECTION, SOLUTION INTRAMUSCULAR ONCE
Refills: 0 | Status: DISCONTINUED | OUTPATIENT
Start: 2024-07-23 | End: 2024-07-23

## 2024-07-23 RX ADMIN — LIDOCAINE HCL 1 PATCH: 28 GEL TOPICAL at 04:47

## 2024-07-23 RX ADMIN — KETOROLAC TROMETHAMINE 15 MILLIGRAM(S): 30 INJECTION, SOLUTION INTRAMUSCULAR at 04:47

## 2024-07-23 RX ADMIN — DIAZEPAM 10 MILLIGRAM(S): 10 TABLET ORAL at 04:46

## 2024-07-23 RX ADMIN — Medication 975 MILLIGRAM(S): at 04:46

## 2024-07-23 NOTE — ED ADULT NURSE NOTE - OBJECTIVE STATEMENT
Mckenzie RN: received pt sitting on the stretcher in room 4. Pt is alert and oriented x, ambulatory, has chronic lower back pain. Pt was seen in this ER yesterday and was discharged around 3pm. But tonight she started having severe pain and after she took the pain medicine she threw up and so came to the ER. Denies any other complaints or associated symptoms. Meds administered as per order. vs as charted. Will reevaluate .

## 2024-07-23 NOTE — ED ADULT TRIAGE NOTE - CHIEF COMPLAINT QUOTE
Patient c/o back pain, seen earlier and DC'd for same complaint. Returning with worsening pain and nausea.  Hx. HTN, HLD and fibromyalgia.

## 2024-07-23 NOTE — ED PROVIDER NOTE - CLINICAL SUMMARY MEDICAL DECISION MAKING FREE TEXT BOX
69-year-old female past medical history of hypertension, hyperlipidemia, hypothyroidism, fibromyalgia, chronic back pain, seen in the ED on July 22, 2024 secondary to worsening back pain x 3 days presents to the ED for similar complaint. no concern for cord compression. likely msk. will give meds and re-eval.

## 2024-07-23 NOTE — ED ADULT NURSE REASSESSMENT NOTE - NS ED NURSE REASSESS COMMENT FT1
Pt received from BREAK RN on stretcher, A/Ox4 answers all questions appropriately. Pt denies chest pain and SOB during reassessment, breathing is even and unlabored on room air. Abdomen is soft and non-distended. Pt ambulates independently at baseline, moves all four extremities on command, skin is intact. Pt resting comfortably at the bedside, No apparent acute visible distress noted on reassessment. Vital signs stable, NKA to medications. Pending dispo

## 2024-07-23 NOTE — ED PROVIDER NOTE - NSFOLLOWUPINSTRUCTIONS_ED_ALL_ED_FT
Back Pain    Back pain is very common in adults. The cause of back pain is rarely dangerous and the pain often gets better over time. The cause of your back pain may not be known and may include strain of muscles or ligaments, degeneration of the spinal disks, or arthritis. Occasionally the pain may radiate down your leg(s). Over-the-counter medicines to reduce pain and inflammation are often the most helpful. Stretching and remaining active frequently helps the healing process.     You were seen in the ED for back pain.    You were seen in the emergency department (ED) today for back pain. Acute back pain is the second most common reason for a physician visit and affects 80% to 85% of people over their lifetime. Most episodes of back pain are not serious and resolve within weeks with conservative therapy.    There are many causes of back pain. Most of the time, the pain is caused by conditions such as a muscle strain, inflammation or a bulging disc that cannot be identified on an X-ray or CT scan. Diagnostic imaging does not accurately identify the cause of most low back pain and therefore does not help guide therapy or improve the time to recovery.    Back pain is very common in adults. The cause of back pain is rarely dangerous and the pain often gets better over time. The cause of your back pain may not be known and may include strain of muscles or ligaments, degeneration of the spinal disks, or arthritis. Occasionally the pain may radiate down your leg(s). Over-the-counter medicines to reduce pain and inflammation are often the most helpful. Stretching and remaining active frequently helps the healing process.    Your provider today has determined that you are not exhibiting any of these worrisome symptoms or physical exam findings. The American College of Emergency Physicians, American College of Physicians, American Society of Anesthesiologists, and the American College of Radiology have all independently advised that acute imaging studies in patients with musculoskeletal low back pain are usually inappropriate and not necessary.    Your provider today has determined that you do not need an emergent MRI. This does not mean that you may not require an MRI as an outpatient in the future if your pain persists or if you develop additional neurologic symptoms.    It is extremely important for you to follow up with your outpatient provider for further examination and discussion regarding treatment and imaging, if necessary.    1.  Please take motrin OR ibu profen OR advil (600 mg by mouth up to every 6 hours with food and water).  The lidocaine patch you may get over the counter is called 'Salon Paas' If you wish to use this, please use as indicated on box.   2.  You may also use a heating pad for additional comfort. Do not apply a heating pad over a lidocaine patch.  If you are wearing a patch you may remove it or use the heating pad at an alternate location.  3.  Please return to the ER immediately if you develop leg weakness, loss of bowel or bladder control (meaning that you are urinating or defecating on yourself when you did not mean to or are unable to urinate or defecate when you feel you must.  Also return to the ER if you develop numbness of your groin or genitals.  Even if the pain is well controlled or you are able to tolerate the pain, development of any of these symptoms may represent a medical emergency requiring immediate intervention.  Do not delay presentation should these occur.    SEEK IMMEDIATE MEDICAL CARE IF YOU HAVE ANY OF THE FOLLOWING SYMPTOMS: bowel or bladder control problems, unusual weakness or numbness in your arms or legs, nausea or vomiting, abdominal pain, fever, dizziness/lightheadedness.    please follow up with your doctor, Dr. greenberg for continued management. bellow are other specialist you may wish to follow up with:      spine center: [phone]    you can consider following with the Osteopathic manipulative medicine clinic at [facility]  ([facility], [facility], [facility], [facility], and [facility])   [place]   [address]   [place] [address]   [phone]    [facility] Division of Orthopaedics at [facility]. Please call [phone] for an appointment. Back Pain    Back pain is very common in adults. The cause of back pain is rarely dangerous and the pain often gets better over time. The cause of your back pain may not be known and may include strain of muscles or ligaments, degeneration of the spinal disks, or arthritis. Occasionally the pain may radiate down your leg(s). Over-the-counter medicines to reduce pain and inflammation are often the most helpful. Stretching and remaining active frequently helps the healing process.     You were seen in the ED for back pain.    You were seen in the emergency department (ED) today for back pain. Acute back pain is the second most common reason for a physician visit and affects 80% to 85% of people over their lifetime. Most episodes of back pain are not serious and resolve within weeks with conservative therapy.    There are many causes of back pain. Most of the time, the pain is caused by conditions such as a muscle strain, inflammation or a bulging disc that cannot be identified on an X-ray or CT scan. Diagnostic imaging does not accurately identify the cause of most low back pain and therefore does not help guide therapy or improve the time to recovery.    Back pain is very common in adults. The cause of back pain is rarely dangerous and the pain often gets better over time. The cause of your back pain may not be known and may include strain of muscles or ligaments, degeneration of the spinal disks, or arthritis. Occasionally the pain may radiate down your leg(s). Over-the-counter medicines to reduce pain and inflammation are often the most helpful. Stretching and remaining active frequently helps the healing process.    Your provider today has determined that you are not exhibiting any of these worrisome symptoms or physical exam findings. The American College of Emergency Physicians, American College of Physicians, American Society of Anesthesiologists, and the American College of Radiology have all independently advised that acute imaging studies in patients with musculoskeletal low back pain are usually inappropriate and not necessary.    Your provider today has determined that you do not need an emergent MRI. This does not mean that you may not require an MRI as an outpatient in the future if your pain persists or if you develop additional neurologic symptoms.    It is extremely important for you to follow up with your outpatient provider for further examination and discussion regarding treatment and imaging, if necessary.    1.  MEDICINE:  -Please take motrin OR ibu profen OR advil (600 mg by mouth up to every 6 hours with food and water).  please do not take to naproxen that was prescribed to you previously.   -The lidocaine patch you may get over the counter is called 'Sebastian Corbin' If you wish to use this, please use as indicated on box.   -please take tylenol 1000mg every 6hours for pain  - you were prescribed valium 5mg, please take up to every 8hrs for muscle spams if you take the valium please do not take to Robaxin which was previously prescribed to you     2.  You may also use a heating pad for additional comfort. Do not apply a heating pad over a lidocaine patch.  If you are wearing a patch you may remove it or use the heating pad at an alternate location.  3.  Please return to the ER immediately if you develop leg weakness, loss of bowel or bladder control (meaning that you are urinating or defecating on yourself when you did not mean to or are unable to urinate or defecate when you feel you must.  Also return to the ER if you develop numbness of your groin or genitals.  Even if the pain is well controlled or you are able to tolerate the pain, development of any of these symptoms may represent a medical emergency requiring immediate intervention.  Do not delay presentation should these occur.    SEEK IMMEDIATE MEDICAL CARE IF YOU HAVE ANY OF THE FOLLOWING SYMPTOMS: bowel or bladder control problems, unusual weakness or numbness in your arms or legs, nausea or vomiting, abdominal pain, fever, dizziness/lightheadedness.    please follow up with your doctor, Dr. greenberg for continued management. bellow are other specialist you may wish to follow up with:      spine center: [phone]    you can consider following with the Osteopathic manipulative medicine clinic at [facility]  ([facility], [facility], [facility], [facility], and [facility])   [place]   [address]   [place] [address]   [phone]    [facility] Division of Orthopaedics at [facility]. Please call [phone] for an appointment.

## 2024-07-23 NOTE — ED PROVIDER NOTE - PROGRESS NOTE DETAILS
Teresa Archuleta MD (PGY-3 EM): patient feels improved, discussed need for fu, return precautions. sent valium to pharmacy, instructed patient to use valium, not Robaxin.

## 2024-07-23 NOTE — ED PROVIDER NOTE - PHYSICAL EXAMINATION
PHYSICAL EXAM:  CONSTITUTIONAL: Well appearing, awake, alert, oriented to person, place, time/situation and in no apparent distress.  HEAD: Atraumatic  EYES: Clear bilaterally, pupils equal, round and reactive to light.  ENMT: Airway patent, Nasal mucosa clear. Mouth with normal mucosa. Uvula is midline.   CARDIAC: Normal rate, regular rhythm. +S1/S2. No murmurs, rubs or gallops.  RESPIRATORY: Breathing unlabored. Breath sounds clear and equal bilaterally.  ABDOMEN:  Soft, nontender, nondistended. No rebound tenderness or guarding.  NEUROLOGICAL: Alert and oriented, no focal deficits, no motor or sensory deficits. Sensation intact x4 extremities.  SKIN: Skin warm and dry. No evidence of rashes or lesions.  gait: able to ambulate without assistance.

## 2024-07-23 NOTE — ED PROVIDER NOTE - NSICDXPASTMEDICALHX_GEN_ALL_CORE_FT
PAST MEDICAL HISTORY:  Fibromyalgia     History of TMJ disorder     HLD (hyperlipidemia)     HTN (hypertension)     Hypothyroidism     Thyroid cancer

## 2024-07-23 NOTE — ED PROVIDER NOTE - OBJECTIVE STATEMENT
69-year-old female past medical history of hypertension, hyperlipidemia, hypothyroidism, fibromyalgia, chronic back pain, seen in the ED on July 22, 2024 secondary to worsening back pain x 3 days presents to the ED for similar complaint.  Patient was discharged with prescription of Robaxin after receiving Toradol, Tylenol, lidocaine, Valium in the ED. patient states she took a naproxen and robaxin at 11pm 7/22/24. patient follows w/ dr greenberg (pain managment). patient has receieve nerve ablations and injections in past, most recently 1 year ago. no recent trauma.

## 2024-07-23 NOTE — ED PROVIDER NOTE - ATTENDING CONTRIBUTION TO CARE
69-year-old woman, history hypertension, obesity, hypothyroid, fibromyalgia, chronic back pain/degenerative hanges seen on 2019 MRI spine with prior nerve ablations >1yr ago, seen 1d ago in this ED for back pain dc on robaxin and naproxen, now returns p/w unhanged back pain. Pt has been taking subtheraputic doses of APAP and Naproxen since her prior dc. No fevers, chills, recent weight loss, recent spinal procedures, bowel/bladder incontinence, IVDU, cancer, trauma ,weakness numbness, tingling, or urinary sx. Pt feels she can fully empty bladder as well.    VS: afebrile, others  reassuring   Gen: Well appearing in mild pain, walking the halls, obese,   Head: NC/AT  ENT: mmm  Neck: trachea midline, FROM (painless) - neg Brudzinski   Resp:  No distress  Abd: soft NT  Back: no midline ttp  Ext: no deformities  Neuro: A&Ox3, spont. interactive. CN2-12 intact. GCS 15. Strength 5/5 b/l UEs & LEs. No gross sensory deficits. No pronator drift b/l UEs. Finger to nose intact b/l. gait slow yet stable/unassisted.   Skin:  Warm and dry as visualized  Psych:  appropriate affect and mood    Initial ED MDM: hx/exam most c/w MSK back pain.  No e/o SC compression of any kind, neuro deficit, PARK.   will give multimodal pain control, encourage prompt f/u w/ pt's back/pain control doctor

## 2024-07-23 NOTE — ED ADULT NURSE REASSESSMENT NOTE - NSFALLHARMRISKINTERV_ED_ALL_ED

## 2024-07-26 ENCOUNTER — APPOINTMENT (OUTPATIENT)
Dept: ORTHOPEDIC SURGERY | Facility: CLINIC | Age: 69
End: 2024-07-26
Payer: MEDICARE

## 2024-07-26 ENCOUNTER — NON-APPOINTMENT (OUTPATIENT)
Age: 69
End: 2024-07-26

## 2024-07-26 VITALS
WEIGHT: 232 LBS | HEIGHT: 64 IN | SYSTOLIC BLOOD PRESSURE: 143 MMHG | TEMPERATURE: 97.9 F | HEART RATE: 60 BPM | DIASTOLIC BLOOD PRESSURE: 79 MMHG | BODY MASS INDEX: 39.61 KG/M2 | OXYGEN SATURATION: 93 %

## 2024-07-26 DIAGNOSIS — M54.9 DORSALGIA, UNSPECIFIED: ICD-10-CM

## 2024-07-26 PROCEDURE — 99205 OFFICE O/P NEW HI 60 MIN: CPT

## 2024-07-26 RX ORDER — TIZANIDINE 2 MG/1
2 TABLET ORAL EVERY 6 HOURS
Qty: 56 | Refills: 1 | Status: ACTIVE | COMMUNITY
Start: 2024-07-26 | End: 1900-01-01

## 2024-07-26 RX ORDER — DICLOFENAC SODIUM 75 MG/1
75 TABLET, DELAYED RELEASE ORAL
Qty: 28 | Refills: 0 | Status: ACTIVE | COMMUNITY
Start: 2024-07-26 | End: 1900-01-01

## 2024-07-26 NOTE — ADDENDUM
[FreeTextEntry1] :  I, Yana Manzo, acted solely as a scribe for Dr. Lance Edgar MD on this date 07/26/2024   All medical record entries made by the Scribe were at my, Dr. Lance Edgar MD., direction and personally dictated by me on 07/26/2024. I have reviewed the chart and agree that the record accurately reflects my personal performance of the history, physical exam, assessment and plan. I have also personally directed, reviewed, and agreed with the chart.
None

## 2024-07-26 NOTE — HISTORY OF PRESENT ILLNESS
[de-identified] : Patient is a 69 year old female who presents for initial eval of chronic back pain. States she had an episode of RT arm radiating sxs, from shoulder to elbow. Denies any LE radicular sxs. Able to walk 1 block when sxs are not severe. Followed up to ED d/t intensity of pain. Obtained Robaxin, Lidocaine patches, and Naproxen which helped reduce pain.

## 2024-07-26 NOTE — PHYSICAL EXAM
[de-identified] : Lumbar Physical Exam   Antalgic Gait   Forward Pitched posture, able to correct to neutral    Sagittal balance - Normal   Compensatory mechanism? - None   Unable to toe/heel walk     Reflexes    Patellar - normal    Gastroc - normal    Clonus - No    Hip Exam - Normal   Straight leg raise - none   Pulses - 2+ dp/pt   Range of motion - normal    Sensation   Sensation intact to light touch in L1, L2, L3, L4, L5 and S1 dermatomes bilaterally     Motor             IP Quad HS TA Gastroc EHL   Right 5/5 5/5   5/5 5/5    5/5     5/5       Left   5/5 5/5 5/5 5/5 5/5 5/5

## 2024-07-26 NOTE — ASSESSMENT
[FreeTextEntry1] : This is a 69 year female with lower back pain. I had a lengthy discussion with the patient in regard to their treatment plan and diagnosis. Their symptoms have persisted despite the conservative management they have attempted thus far. As a result, I would like to proceed with a lumbar MRI. In tandem with this they should begin a home therapy program. The patient can take Tizanidine, Diclofenac, Tylenol/NSAIDs as needed for pain control if medically able to. I will have the patient follow-up in 3 to 4 weeks for repeat clinical evaluation, and to review their MRI results. I encouraged them to follow-up sooner if their symptoms worsen or change in any way. Conservative Treatment Statement The patient has tried the following treatments: Activity modification + Ice/Compression + Braces - NSAIDS + Physical Therapy +   Please note the above modalities have been tried for 6+ weeks over the past 2 months.

## 2024-07-30 NOTE — ED ADULT TRIAGE NOTE - HEART RATE (BEATS/MIN)
88 My signature below certifies that the above stated patient is homebound and upon completion of the Face-To-Face encounter, has the need for intermittent skilled nursing, physical therapy and/or speech or occupational therapy services in their home for their current diagnosis as outlined in their initial plan of care. These services will continue to be monitored by myself or another physician.

## 2024-08-06 ENCOUNTER — OUTPATIENT (OUTPATIENT)
Dept: OUTPATIENT SERVICES | Facility: HOSPITAL | Age: 69
LOS: 1 days | End: 2024-08-06
Payer: MEDICARE

## 2024-08-06 VITALS
HEART RATE: 82 BPM | OXYGEN SATURATION: 96 % | WEIGHT: 229.06 LBS | SYSTOLIC BLOOD PRESSURE: 112 MMHG | RESPIRATION RATE: 18 BRPM | TEMPERATURE: 98 F | HEIGHT: 64 IN | DIASTOLIC BLOOD PRESSURE: 63 MMHG

## 2024-08-06 DIAGNOSIS — N95.0 POSTMENOPAUSAL BLEEDING: ICD-10-CM

## 2024-08-06 DIAGNOSIS — Z96.653 PRESENCE OF ARTIFICIAL KNEE JOINT, BILATERAL: Chronic | ICD-10-CM

## 2024-08-06 DIAGNOSIS — D17.20 BENIGN LIPOMATOUS NEOPLASM OF SKIN AND SUBCUTANEOUS TISSUE OF UNSPECIFIED LIMB: Chronic | ICD-10-CM

## 2024-08-06 DIAGNOSIS — Z96.659 PRESENCE OF UNSPECIFIED ARTIFICIAL KNEE JOINT: Chronic | ICD-10-CM

## 2024-08-06 DIAGNOSIS — E89.0 POSTPROCEDURAL HYPOTHYROIDISM: Chronic | ICD-10-CM

## 2024-08-06 PROCEDURE — 85027 COMPLETE CBC AUTOMATED: CPT

## 2024-08-06 PROCEDURE — 80048 BASIC METABOLIC PNL TOTAL CA: CPT

## 2024-08-06 PROCEDURE — G0463: CPT

## 2024-08-06 PROCEDURE — 36415 COLL VENOUS BLD VENIPUNCTURE: CPT

## 2024-08-06 NOTE — H&P PST ADULT - NSICDXPASTSURGICALHX_GEN_ALL_CORE_FT
PAST SURGICAL HISTORY:  H/O partial thyroidectomy     H/O total knee replacement, bilateral     Lipoma of upper arm

## 2024-08-06 NOTE — H&P PST ADULT - NSICDXPASTMEDICALHX_GEN_ALL_CORE_FT
PAST MEDICAL HISTORY:  Back muscle spasm     Fibromyalgia     History of TMJ disorder     HLD (hyperlipidemia)     HTN (hypertension)     Hypothyroidism     Obesity, Class II, BMI 35-39.9     ROCHELLE on CPAP     Thyroid cancer

## 2024-08-06 NOTE — H&P PST ADULT - HISTORY OF PRESENT ILLNESS
69yr old female G P with hx of PMB. Pt states she had heavy bleeding for 7days and light spotting for 3 days. Pt denies pain, cramping or bloating. Now coming in for D&C operative hysteroscopy with Aveta. Hx sig for HTN HLD hypothyroid   ROCHELLE  BMI 39.3

## 2024-08-26 ENCOUNTER — TRANSCRIPTION ENCOUNTER (OUTPATIENT)
Age: 69
End: 2024-08-26

## 2024-08-27 ENCOUNTER — RESULT REVIEW (OUTPATIENT)
Age: 69
End: 2024-08-27

## 2024-08-27 ENCOUNTER — OUTPATIENT (OUTPATIENT)
Dept: OUTPATIENT SERVICES | Facility: HOSPITAL | Age: 69
LOS: 1 days | End: 2024-08-27
Payer: MEDICARE

## 2024-08-27 ENCOUNTER — TRANSCRIPTION ENCOUNTER (OUTPATIENT)
Age: 69
End: 2024-08-27

## 2024-08-27 VITALS
SYSTOLIC BLOOD PRESSURE: 116 MMHG | DIASTOLIC BLOOD PRESSURE: 66 MMHG | RESPIRATION RATE: 15 BRPM | HEART RATE: 70 BPM | OXYGEN SATURATION: 97 %

## 2024-08-27 VITALS
RESPIRATION RATE: 16 BRPM | SYSTOLIC BLOOD PRESSURE: 123 MMHG | HEIGHT: 64 IN | WEIGHT: 229.06 LBS | TEMPERATURE: 97 F | OXYGEN SATURATION: 97 % | HEART RATE: 80 BPM | DIASTOLIC BLOOD PRESSURE: 79 MMHG

## 2024-08-27 DIAGNOSIS — Z96.653 PRESENCE OF ARTIFICIAL KNEE JOINT, BILATERAL: Chronic | ICD-10-CM

## 2024-08-27 DIAGNOSIS — D17.20 BENIGN LIPOMATOUS NEOPLASM OF SKIN AND SUBCUTANEOUS TISSUE OF UNSPECIFIED LIMB: Chronic | ICD-10-CM

## 2024-08-27 DIAGNOSIS — N95.0 POSTMENOPAUSAL BLEEDING: ICD-10-CM

## 2024-08-27 DIAGNOSIS — E89.0 POSTPROCEDURAL HYPOTHYROIDISM: Chronic | ICD-10-CM

## 2024-08-27 PROCEDURE — 58558 HYSTEROSCOPY BIOPSY: CPT

## 2024-08-27 PROCEDURE — 88305 TISSUE EXAM BY PATHOLOGIST: CPT

## 2024-08-27 PROCEDURE — 88305 TISSUE EXAM BY PATHOLOGIST: CPT | Mod: 26

## 2024-08-27 PROCEDURE — C1782: CPT

## 2024-08-27 DEVICE — AVETA FLEX RESECTING DEVICE 2.9MM: Type: IMPLANTABLE DEVICE | Status: FUNCTIONAL

## 2024-08-27 RX ORDER — ONDANSETRON 2 MG/ML
4 INJECTION, SOLUTION INTRAMUSCULAR; INTRAVENOUS ONCE
Refills: 0 | Status: DISCONTINUED | OUTPATIENT
Start: 2024-08-27 | End: 2024-09-10

## 2024-08-27 RX ORDER — DICLOFENAC 35 MG/1
1 CAPSULE ORAL
Refills: 0 | DISCHARGE

## 2024-08-27 RX ORDER — ACETAMINOPHEN 325 MG/1
3 TABLET ORAL
Qty: 0 | Refills: 0 | DISCHARGE

## 2024-08-27 RX ORDER — IBUPROFEN 600 MG
1 TABLET ORAL
Qty: 0 | Refills: 0 | DISCHARGE

## 2024-08-27 RX ORDER — LIDOCAINE HCL 20 MG/ML
0.2 VIAL (ML) INJECTION ONCE
Refills: 0 | Status: COMPLETED | OUTPATIENT
Start: 2024-08-27 | End: 2024-08-27

## 2024-08-27 RX ORDER — TIZANIDINE HYDROCHLORIDE 2 MG/1
1 CAPSULE ORAL
Refills: 0 | DISCHARGE

## 2024-08-27 RX ORDER — OXYCODONE HYDROCHLORIDE 5 MG/1
5 TABLET ORAL ONCE
Refills: 0 | Status: DISCONTINUED | OUTPATIENT
Start: 2024-08-27 | End: 2024-08-27

## 2024-08-27 RX ORDER — TRAZODONE HCL 100 MG
1 TABLET ORAL
Refills: 0 | DISCHARGE

## 2024-08-27 RX ADMIN — Medication 100 MILLILITER(S): at 12:45

## 2024-08-27 NOTE — ASU PATIENT PROFILE, ADULT - FALL HARM RISK - UNIVERSAL INTERVENTIONS
Bed in lowest position, wheels locked, appropriate side rails in place/Call bell, personal items and telephone in reach/Instruct patient to call for assistance before getting out of bed or chair/Non-slip footwear when patient is out of bed/[place] to call system/Physically safe environment - no spills, clutter or unnecessary equipment/Purposeful Proactive Rounding/Room/bathroom lighting operational, light cord in reach

## 2024-08-27 NOTE — PRE-ANESTHESIA EVALUATION ADULT - NSANTHPMHFT_GEN_ALL_CORE
69yr old female with hx of PMB. Pt states she had heavy bleeding for 7days and light spotting for 3 days now coming in for D&C operative hysteroscopy with Aveta. Hx sig for HTN HLD hypothyroid, ROCHELLE  BMI 39.3

## 2024-08-27 NOTE — ASU DISCHARGE PLAN (ADULT/PEDIATRIC) - ASU DC SPECIAL INSTRUCTIONSFT
Postoperative Instructions    For pain control, take the followin. Ibuprofen 600mg every 6 hours, take with food  2. Add Tylenol 975mg every 6 hours, alternated with ibuprofen  Tylenol and ibuprofen may be obtained over the counter.    Return to your regular way of eating.     Resume normal activity as tolerated, but no heavy lifting or strenuous activity for 2 weeks. Complete vaginal rest, no tampons, no douching, no tub bathing, no sexual activities for 2 weeks unless otherwise instructed by your doctor.        Call your doctor with any signs and symptoms of infection such as fever (>100.4 F), chills, nausea or vomiting.  Call your doctor if you're unable to tolerate food or have difficulty urinating.  Call your doctor if you have pain that is not relieved by your prescribed medications.    Notify your doctor with any other concerns. Follow up with your doctor in 2 weeks for a post-operative appointment.

## 2024-08-27 NOTE — BRIEF OPERATIVE NOTE - OPERATION/FINDINGS
EUA: normal external genitalia, cystocele anteriorly, rectocele posteriorly, small anteverted uterus   Hysteroscopy revealed hyperemic uterine tissue. Ostia noted and wnl b/l. Aveta device used to sample endometrial shavings from posterior wall of uterus.

## 2024-08-27 NOTE — ASU DISCHARGE PLAN (ADULT/PEDIATRIC) - NS MD DC FALL RISK RISK
Sleep Study Documentation    Pre-Sleep Study       Sleep testing procedure explained to patient:YES    Patient napped prior to study:YES- less than 30 minutes  Napped after 2PM: yes    Caffeine:Dayshift worker after 12PM   Caffeine use:NO    Alcohol:Dayshift workers after 5PM: Alcohol use:NO    Typical day for patient:YES       Study Documentation    Sleep Study Indications: Snoring, EDS, Chronic AM Headaches, CK    Sleep Study: Diagnostic   Snore:Severe  Supplemental O2: no    O2 flow rate (L/min) range   O2 flow rate (L/min) final   Minimum SaO2 89%  Baseline SaO2 97%    EKG abnormalities: no     EEG abnormalities: no    Sleep Study Recorded < 2 hours: N/A    Sleep Study Recorded > 2 hours but incomplete study: N/A    Sleep Study Recorded 6 hours but no sleep obtained: NO    Patient classification:        Post-Sleep Study    Medication used at bedtime or during sleep study:YES, N/A    Patient reports time it took to fall asleep:20 to 30 minutes    Patient reports waking up during study:1 to 2 times  Patient reports returning to sleep without difficulty  Patient reports sleeping 2 to 4 hours with dreaming  Patient reports sleep during study:typical    Patient rated sleepiness: Very sleepy or tired    PAP treatment:no 
For information on Fall & Injury Prevention, visit: [email or web address] OR  [email or web address] OR  https://www.cdc.gov/steadi/patient.html

## 2024-08-27 NOTE — BRIEF OPERATIVE NOTE - NSICDXBRIEFPROCEDURE_GEN_ALL_CORE_FT
PROCEDURES:  Hysteroscopy 27-Aug-2024 17:45:52  Nicole Aguilar  Dilation and curettage, uterus 27-Aug-2024 17:46:00  Nicole Aguilar

## 2024-08-27 NOTE — ASU DISCHARGE PLAN (ADULT/PEDIATRIC) - CARE PROVIDER_API CALL
Lane Dean)  Obstetrics and Gynecology  [address]  [place] [address]  Phone: [phone]  Fax: [phone]  Follow Up Time: 2 weeks

## 2024-08-27 NOTE — ASU PATIENT PROFILE, ADULT - PRO ARRIVE FROM
PROGRESS NOTE    Time Started:  1102     Time Ended: 1155    REASON FOR VISIT:  Psychotherapy    PROBLEM/CHIEF COMPLAINT:  Mood Issues; Anxiety; Behavior Disturbance    TREATMENT PLAN REVIEW DATE (every three months): 11/06/19      CURRENT MEDICAL STRESSORS:    Patient Active Problem List   Diagnosis   • Anxiety   • Bipolar 1 disorder (CMS/HCC)   • Dyslipidemia   • Hypertension   • Hypogonadism male   • Hypothyroid   • Presbyopia   • Sleep apnea   • Obesity (BMI 30.0-34.9)   • Depression   • Alcohol abuse       CURRENT MEDICATIONS:     Current Outpatient Medications   Medication Sig Dispense Refill   • VYVANSE 40 MG capsule TAKE ONE CAPSULE BY MOUTH IN THE MORNING  30 capsule 0   • gabapentin (NEURONTIN) 100 MG capsule Take 1 capsule in the morning 2 tabs at night 90 capsule 1   • amLODIPine (NORVASC) 10 MG tablet Take 1 tablet by mouth daily. 90 tablet 1   • levothyroxine (SYNTHROID, LEVOTHROID) 75 MCG tablet Take 1 tablet by mouth daily. 90 tablet 1     No current facility-administered medications for this visit.        DESCRIPTION OF SESSION:    New Stressors: The patient reports no new stress    Progress Towards Goal(s )of  Managing Moods; Managing Anxiety; Changing Behavior:  · He completed parts of the ACES, CELESTE, and PTSD Symptom Checklist; he had questions and was concerned that he was not doing it correctly  · He is feeling nervous about EMDR but only because he worries he will not \"do it right\"    Therapeutic Response:    · assisted the patient to process his thoughts and feelings  · explored the issues he raised  · validated his point of view  · affirmed all his efforts to work on stabilizing himself   · Discussed starting the Resource Enhancement phase of EMDR; he agrees  Phase II: Preparation  EMDR was reviewed  coping skills were discussed  a safe/calm place was identified  Safe/Calm Place: [place] in [place]  Cue Word: Hussein  completed BLS with safe/calm place  was able to shift from disturbing event  to safe/calm place and experience relaxation with some difficulty  He realized that another part of him \"troubled Justen\" was wanting to sabotage this process  Worked to incorporate this part of himself in the process to gain his cooperation and allow that part to feel accepted  He was able to include to parts of self in exercise    Homework for Next Session:    · The patient will notice times of stability and times of instability; he will notice differences    Interventions\Strategies during Session:    · Binh-Establishing an agreement with the patient for the purpose of personal growth and development; or safety.·   · Decision-Making-Assist patient to stop and think before making decisions; also to evaluate choices that are consistent with values and goals.·   · ValuesClarification- Assist patient to understand what guiding principles are important.·   · Goal Setting-Assist patient to establish goals that are realistic, specific, behavioral, based upon the patient's values.·   · Jzigmol-Jgexped-Mhiphdofnd identification of problems and solutions that are effective.·   · Foster Insight-Assisting the patient to gain understanding into the patient's own motivations, perceptions,behavior choices, thoughts/feelings in a specific situation in order to increase personal power.·   · Motivational Interviewing- A specific technique using a nondirective and an empathic eliciting style to motivate the client, using the client’s own motivation to change; and guiding the client to articulate ambivalence.·  · Patient Education-Teaching about a specific topic that will enhance the patient's social or emotional health.    · Stress Management--Faciliate the patient's understanding, awareness, and tolerance of external and internal demands or threats to the patient's psychological well-being.  Assist the patient to be aware of and tolerate their feelings; rather than avoiding them in maladaptive ways.·   · Supportive  Therapy-Listen and encourage verbalization of thoughts/feelings, invite participation indecision-making, and assist patient to identify problems/solutions.    Mental Status Exam  Justen is a 43 year old  male who appears  neat and clean, casually dressed, average size and appeared to be stated age.  The following areas were assessed:    Behavior: Cooperative, Polite  Eye Contact: Appropriate   Speech: Logical and Coherent  Gait, movement and Motor Coordination: Within Normal Limits and Limping  Alert and Oriented: Yes to Person, Place, and Time  Mood/Affect: Anxiety  Organization of thought: Logical and Coherent   Cognition:  He did not appear to have any gross cognitive difficulties that would have prevented him from understanding or interacting with this .  The Patient's cognitive abilities are estimated to be Average.  Insight and Judgment: Good   Self-Harm: Suicidal ideation, plan, or intent reported? No  Homicidal Ideation: Homicidal ideation, plan, or intent reported? No  Altered Thought Processes: Hallucinations or delusions reported or observed? No  Domestic Violence: No  Physical/Sexual Abuse: No    APA DIAGNOSIS:        AXIS I:      Bipolar disorder.,Attention Deficit Hyperactivity Disorder., Alcohol Use Disorder., Anxiety, generalized, PTSD        AXIS II:     Deferred.        AXIS III:   See Above        AXIS IV:    Severity of psychosocial stressors- Moderate,                        Treatment Plan    This treatment plan was collaboratively developed with the patient.    Formal treatment Plan Review Date (every two months): 11/06/19    Diagnosis #1: Bipolar Disorder    Desired Outcomes:    1. Identify and resolve any issues caused by symptoms, including behavior problems, mood issues, anxiety, relationship problems, school related problems, or self-esteem issues as evidenced by participation in individual and family therapy.  2. Gain a through knowledge of Bipolar Disorder as  evidenced by participation in psychoeducation.    3. Learn to regulate lifestyle choices to minimize symptoms  4. Develop a solid support system to maintain a positive outlook and motivation to follow treatment protocol     5. Learn and practice how to Lower level of psychic energy and return to normal activity levels.  6. Increase good judgment, stable mood, and goal directed behavior.  7. Reduce agitation, impulsivity, and ineffective or destructive behaviors.  8. Increase sensitivity to the consequences of behaviosr and have more realistic expectations.  9. Accomplish controlled behavior, moderated mood, and thought process through psychotherapy and medication.    Diagnosis #2: Anxiety    Desired Outcomes:    10. Experience more contentment and increase capacity to carry out daily routines as evidenced by a lower score on the PHQ and a subjective report that the   level of  anxiety is 3 or less on a 1/10 scale.  11. Recognize and accept anxiety when present and continue to make effective choices as evidenced by the patient's self-report.  12. Establish healthy cognitive patterns and beliefs about self and the world that lead to the ability to effectively manage the full range of life's anxieties as evidenced by the patient's self-report and a lower score on the PHQ.    Diagnosis #3: ADHD    Desired Outcomes:    13. Decrease impulsive behaviors while enhancing attentiveness on low-interest activities per self and family report.  14. Accept ADD as a chronic issue per self report.  15. Develop equilibrium of structure and intimacy into client’s personal life per self and family report.  16. Maintain interest and focus for longer periods of time.    Diagnosis #4: Alcohol Use Disorder    Desired Outcomes:    1. Acknowledge, accept, and assume responsibility for substance use as evidenced by the awareness of the consequences of use and the decision to stay within the safe limits of alcohol use as established by the  [employer].  2. Develop a lifestyle that supports the reduction of alcohol use and refrain from behaviors that do not as evidenced by self report.     3. Identify and resolve issues of loss, trauma, or regret that may contribute to substance use discussions during therapy sessions.  4. Learn to tolerate and express negative or uncomfortable feelings, and disturbing thoughts, without using any drugs or alcohol as evidenced by self-report.    Diagnosis #5: PTSD    Desired Outcomes:    17. Establish effective coping skills to carry out normal responsibilities and constructive relationships.   18. Recall the traumatic event without becoming overtaken with negative emotions.   19. Identify and change any destructive behaviors that serve to maintain escape and denial; and implement behaviors that promote healing, acceptance of the past events, and responsible functioning.      Goal #1: Managing Moods    How Will I Get There?    · Explore feelings and thoughts about self, his or her own abilities, and future plans.   · Explore mood state, level of energy, level of control over thoughts, and sleeping pattern.   · Refer for psychiatric evaluation for medication need, and to assess hospitalization need to stabilize mood and energy.   · Monitor taking of psychotropic medications as directed.  · Encourage trust in the therapy relationship by sharing fears about dependency, loss, and abandonment.   · Accomplish mood stability, having slower reaction with anger, less expansive, and being more socially appropriate and sensitive.   · Encourage expression of grief, fear, and anger regarding real or imagined losses in life.   · Differ between real and imagined losses, rejections, and abandonment.  · Accept the level of low self esteem and fear of rejection that underlies the behavior.   · Identify and list the causes for the low self esteem and abandonment fears.  · Stop or reduce self destructive behaviors such as promiscuity, substance  abuse, and the expression of overt hostility or aggression.   · Learn to speak more slowly and be more subject oriented.   · Learn to dress and groom in a less attention grabbing manner.   · Express the acceptance of dependency needs.   · Identify and list positive traits and behaviors that help build genuine self esteem.   · Lower grandiose statements and learn to express self more realistically.   · Learn to be less agitated and distracted, and be able to sit quietly and calmly for 30 minutes.   · Agree to sleep about 5 hours or more per night.   · Increase control over thoughts and a slower thinking process.   · Increase ability to stay focused on a single activity to completion.   · Increase an understanding that behavior and judgment are under poor control during manic phase or episode.  · Increase acceptance of the need for ongoing supportive treatment and medication to reduce or eliminate destructive, manic swings.    Goal #2: Managing Anxiety    How Will I Get There?    · Identify and list the symptoms of Post-Traumatic Stress Disorder that cause distress and impaired functioning.   · Participate in EMDR Therapy  · Explain the traumatic event in as clear detail as possible.   · Express feelings that were experienced at the time of the trauma.   · Identify and list how Post-Traumatic Stress Disorder symptoms have affected personal relationships, and social or recreational life.  · Identify and list examples of the loss of control of anger.   · Express an awareness poor anger control and implement anger control techniques.  · Practice relaxation training as a coping mechanism for tension, panic, stress, anger, and anxiety.   · Begin a regular exercise program as a stress release technique.   · Increased comfort and ability to talk and think about the traumatic incident without emotional stress.   · Identify and list negative self-talk that is associated with past trauma and current stimulus triggers for  anxiety.   · Remove negative, self-defeating thinking and replace it with positive, accurate, self-enhancing self-talk.  · Express a hopeful and positive attitude regarding the future.     Barriers To Treatment:    · None Identified    Recommendations  Based on the above information, the following recommendations are made:    · He was advised to schedule an appointment for individual therapy, every 1-2 week(s) for 3 months.    He was made aware of these recommendations and did agree to them.   He was made aware of how to contact the undersigned in case of an emergency.      The patient did consent to the exchange of information between the Department of Psychiatry and other [facility] Providers.    Electronically Signed By: Cydney Mckeon LCSW, [facility],    08/14/19   home

## 2024-08-30 LAB — SURGICAL PATHOLOGY STUDY: SIGNIFICANT CHANGE UP

## 2024-10-04 NOTE — H&P PST ADULT - MS GEN HX ROS MEA POS PC
Fetal Non-Stress Test    Date/Time: 10/4/2024 9:40 AM    Performed by: Yusra Red CNM  Authorized by: Yusra Red 
Sissy is a 41 year old year old female here for a non stress test. She is a  34w6d LUZ 2024.    She reports fetal movement  She denies contractions or cramping  She denies bleeding  She denies abdominal pain  She denies leaking of fluid    Diagnosis: Severe IUGR, Fibroids, Rheumatoid arthritis, Elevated AFP, Uterine Fibroids  FHT Baseline: 135    NST reviewed with Yusra Red CNM    
arthritis/muscle cramps/stiffness

## 2024-12-30 ENCOUNTER — EMERGENCY (EMERGENCY)
Facility: HOSPITAL | Age: 69
LOS: 1 days | Discharge: HOME | End: 2024-12-30
Attending: STUDENT IN AN ORGANIZED HEALTH CARE EDUCATION/TRAINING PROGRAM | Admitting: STUDENT IN AN ORGANIZED HEALTH CARE EDUCATION/TRAINING PROGRAM
Payer: MEDICARE

## 2024-12-30 VITALS
SYSTOLIC BLOOD PRESSURE: 161 MMHG | WEIGHT: 227.96 LBS | HEIGHT: 65 IN | HEART RATE: 82 BPM | DIASTOLIC BLOOD PRESSURE: 88 MMHG | RESPIRATION RATE: 16 BRPM | OXYGEN SATURATION: 95 % | TEMPERATURE: 98 F

## 2024-12-30 DIAGNOSIS — D17.20 BENIGN LIPOMATOUS NEOPLASM OF SKIN AND SUBCUTANEOUS TISSUE OF UNSPECIFIED LIMB: Chronic | ICD-10-CM

## 2024-12-30 DIAGNOSIS — Z96.653 PRESENCE OF ARTIFICIAL KNEE JOINT, BILATERAL: Chronic | ICD-10-CM

## 2024-12-30 DIAGNOSIS — E89.0 POSTPROCEDURAL HYPOTHYROIDISM: Chronic | ICD-10-CM

## 2024-12-30 PROBLEM — E66.9 OBESITY, UNSPECIFIED: Chronic | Status: ACTIVE | Noted: 2024-08-06

## 2024-12-30 PROBLEM — G47.33 OBSTRUCTIVE SLEEP APNEA (ADULT) (PEDIATRIC): Chronic | Status: ACTIVE | Noted: 2024-08-06

## 2024-12-30 PROBLEM — M62.830 MUSCLE SPASM OF BACK: Chronic | Status: ACTIVE | Noted: 2024-08-06

## 2024-12-30 PROCEDURE — 93010 ELECTROCARDIOGRAM REPORT: CPT

## 2024-12-30 PROCEDURE — 99284 EMERGENCY DEPT VISIT MOD MDM: CPT

## 2024-12-30 PROCEDURE — 99053 MED SERV 10PM-8AM 24 HR FAC: CPT

## 2024-12-30 RX ORDER — DIAZEPAM 10 MG/1
5 TABLET ORAL ONCE
Refills: 0 | Status: DISCONTINUED | OUTPATIENT
Start: 2024-12-30 | End: 2024-12-30

## 2024-12-30 RX ORDER — IBUPROFEN 200 MG
600 TABLET ORAL ONCE
Refills: 0 | Status: COMPLETED | OUTPATIENT
Start: 2024-12-30 | End: 2024-12-30

## 2024-12-30 RX ORDER — LIDOCAINE 40 MG/G
1 CREAM TOPICAL ONCE
Refills: 0 | Status: COMPLETED | OUTPATIENT
Start: 2024-12-30 | End: 2024-12-30

## 2024-12-30 RX ADMIN — Medication 15 MILLIGRAM(S): at 10:59

## 2024-12-30 RX ADMIN — DIAZEPAM 5 MILLIGRAM(S): 10 TABLET ORAL at 08:33

## 2024-12-30 RX ADMIN — LIDOCAINE 1 PATCH: 40 CREAM TOPICAL at 08:33

## 2024-12-30 RX ADMIN — Medication 600 MILLIGRAM(S): at 08:33

## 2024-12-30 NOTE — ED PROVIDER NOTE - NSFOLLOWUPINSTRUCTIONS_ED_ALL_ED_FT
1) Please follow-up with your primary care doctor within the next 2-3 days.  Please call today for an appointment.     You previously saw Dr Edgar. We will have our discharge coordinator contact his office but you are able to call the office directly as well for follow-up, recommend within the next 7 days    2) If you have any worsening of symptoms, Numbness or weakness, abdominal pain, normal urination or bowel movements, fevers or chills or any other concerns please return to the ED immediately.  3) Please continue taking the muscle relaxants you are prescribed previously at home.    Take Motrin/Advil (known as ibuprofen) 600 mg every 6 hours as needed for pain.  You could also take Tylenol (acetaminophen) 650 to 1000 mg every 6 hours as needed for pain.    You are also prescribed morphine 15mg tablets as needed for severe pain.  You should take this medication only after having taking ibuprofen or Tylenol and giving it several hours to work..    Acute Low Back Pain    WHAT YOU NEED TO KNOW:    Acute low back pain is sudden discomfort that lasts up to 6 weeks and makes activity difficult.    DISCHARGE INSTRUCTIONS:    Return to the emergency department if:   •You have severe pain.      •You have sudden stiffness and heaviness on both buttocks down to both legs.      •You have numbness or weakness in one leg, or pain in both legs.      •You have numbness in your genital area or across your lower back.      •You cannot control your urine or bowel movements.      Call your doctor if:   •You have a fever.      •You have pain at night or when you rest.      •Your pain does not get better with treatment.      •You have pain that worsens when you cough or sneeze.      •You suddenly feel something pop or snap in your back.      •You have questions or concerns about your condition or care.      Medicines: You may need any of the following:  •NSAIDs, such as ibuprofen, help decrease swelling, pain, and fever. This medicine is available with or without a doctor's order. NSAIDs can cause stomach bleeding or kidney problems in certain people. If you take blood thinner medicine, always ask your healthcare provider if NSAIDs are safe for you. Always read the medicine label and follow directions.      •Acetaminophen decreases pain and fever. It is available without a doctor's order. Ask how much to take and how often to take it. Follow directions. Read the labels of all other medicines you are using to see if they also contain acetaminophen, or ask your doctor or pharmacist. Acetaminophen can cause liver damage if not taken correctly.      •Muscle relaxers decrease pain by relaxing the muscles in your lower spine.      •Prescription pain medicine may be given. Ask your healthcare provider how to take this medicine safely. Some prescription pain medicines contain acetaminophen. Do not take other medicines that contain acetaminophen without talking to your healthcare provider. Too much acetaminophen may cause liver damage. Prescription pain medicine may cause constipation. Ask your healthcare provider how to prevent or treat constipation.       Self-care:   •Stay active as much as you can without causing more pain. Bed rest could make your back pain worse. Start with some light exercises, such as walking. Avoid heavy lifting until your pain is gone. Ask for more information about the activities or exercises that are right for you.        •Apply heat on your back for 20 to 30 minutes every 2 hours for as many days as directed. Heat helps decrease pain and muscle spasms. Alternate heat and ice.      •Apply ice on your back for 15 to 20 minutes every hour or as directed. Use an ice pack, or put crushed ice in a plastic bag. Cover it with a towel before you apply it to your skin. Ice helps prevent tissue damage and decreases swelling and pain.      Prevent acute low back pain:   •Use proper body mechanics. ?Bend at the hips and knees when you  objects. Do not bend from the waist. Use your leg muscles as you lift the load. Do not use your back. Keep the object close to your chest as you lift it. Try not to twist or lift anything above your waist.  How to Lift Items Safely           ?Change your position often when you stand for long periods of time. Rest one foot on a small box or footrest, and then switch to the other foot often.      ?Try not to sit for long periods of time. When you do, sit in a straight-backed chair with your feet flat on the floor. Never reach, pull, or push while you are sitting.      •Do exercises that strengthen your back muscles. Warm up before you exercise. Ask your healthcare provider the best exercises for you.  Warm up and Cool Down            •Maintain a healthy weight. Ask your healthcare provider what a healthy weight is for you. Ask him or her to help you create a weight loss plan if you are overweight.      Follow up with your doctor as directed: Return for a follow-up visit if you still have pain after 1 to 3 weeks of treatment. You may need to visit an orthopedist if your back pain lasts longer than 12 weeks. Write down your questions so you remember to ask them during your visits.

## 2024-12-30 NOTE — ED PROVIDER NOTE - CLINICAL SUMMARY MEDICAL DECISION MAKING FREE TEXT BOX
Patient with acute exacerbation of chronic back pain.  Patient reports requiring nerve ablations in the past.  Has not recently seen a spine specialist, last saw PCP 3 months ago, reports CT of her spine showing stenosis.  Plan: Symptom relief, reassess.  No symptoms to suggest UTI.  Based on history and current presentation no indication for new imaging today.  Patient without focal neurodeficit.

## 2024-12-30 NOTE — ED PROVIDER NOTE - PATIENT PORTAL LINK FT
You can access the FollowMyHealth Patient Portal offered by [facility] by registering at the following website: [email or web address]. By joining [facility]’s FollowMyHealth portal, you will also be able to view your health information using other applications (apps) compatible with our system.

## 2024-12-30 NOTE — ED PROVIDER NOTE - PHYSICAL EXAMINATION
GEN: no acute respiratory distress. nontoxic, speaking comfortably in full sentences, ambulating with steady gait.  HEENT: NCAT. face symmetrical. PERRL 4mm, EOMI, MMM, oropharynx wnl.  Neck: no JVD, trachea midline, no lymphadenopathy, FROM  CV: RRR. +S1S2, no murmur. 2+ pulses in 4 extremities, cap refill <2 sec  Chest: CTA B/l. no wheezing, rales, rhonchi. no retractions. good air movement.   ABD: +BS, soft, non distended, non tender.   : no cva or suprapubic tenderness  MSK: No clubbing, cyanosis, edema. FROM of all extremities. no tenderness to palpation. No midline tenderness, +  Right lumbar paraspinal spasm and tenderness.  No overlying ecchymosis or skin changes. no spinal step-offs.  Neuro: AAOX3.  Sensation intact, motor 5/5 throughout. finger-nose/heal-shin intact. no ataxia  SKIN: No erythema, lesions or rash

## 2024-12-30 NOTE — ED ADULT NURSE NOTE - NSFALLUNIVINTERV_ED_ALL_ED
Bed/Stretcher in lowest position, wheels locked, appropriate side rails in place/Call bell, personal items and telephone in reach/Instruct patient to call for assistance before getting out of bed/chair/stretcher/Non-slip footwear applied when patient is off stretcher/[place] to call system/Physically safe environment - no spills, clutter or unnecessary equipment/Purposeful proactive rounding/Room/bathroom lighting operational, light cord in reach

## 2024-12-30 NOTE — ED PROVIDER NOTE - CARE PROVIDER_API CALL
Lance Edgar  Spine Surgery  [address], [address]  [place] [address]  Phone: [phone]  Fax: [phone]  Follow Up Time: 4-6 Days

## 2024-12-30 NOTE — ED ADULT TRIAGE NOTE - CHIEF COMPLAINT QUOTE
c/o chronic back pain / spasms but worsening last night. Pt also endorses palpitations.  took tizanidine and methocarbamol with no relief. Denies any fall/trauma. Hx. Hypothyroidism, HTN, HLD.

## 2024-12-30 NOTE — ED PROVIDER NOTE - PROGRESS NOTE DETAILS
Patient reports pain moderately relieved.  However pain becomes severe again when attempting to change position.  Patient confirms this has been an ongoing issue, last saw spine specialist July.  Patient again confirms this was not a sudden onset but rather gradual increase of her pain over weeks to months.  No focal numbness weakness.  Will reassess. Patient reassessed.  Pain significantly improved.  Able to ambulate to bathroom comfortably.  Patient advised to follow-up with spine specialist.  Patient given strict return precautions expressed understanding.    Note patient prescribed short course of opiates for severe pain, due to IT issue paper prescription given

## 2024-12-30 NOTE — ED ADULT NURSE NOTE - OBJECTIVE STATEMENT
Patient presents to ED for acute on chronic right lower back pain that has worsened last night. She is A&Ox4. States took medications at home with no relief. Denies trauma, fall. History of HTN, HLD, hypothyroidism.

## 2024-12-30 NOTE — ED PROVIDER NOTE - OBJECTIVE STATEMENT
69-year-old female past medical history hypertension, chronic lumbar back pain presents for worsening of chronic right lumbar back pain.  Reports has been progressively getting worse since July.  Patient denies any recent heavy lifting, exertion, prolonged uncomfortable position.  Reports pain worse with changing position from lying to sitting sitting.  Patient denies any lower extremity numbness or weakness.  Patient denies any abdominal pain dysuria, hematuria, urinary frequency or urgency.  Patient denies any fevers, chills, chest pain, shortness of breath.  Patient denies extremity swelling.  Pain localized to right lumbar paraspinal region.  No radiation.  Patient is been taking methocarbamol and tizanidine for the past several days without significant relief of symptoms.  Describes as spasm-like pain.

## 2024-12-31 ENCOUNTER — EMERGENCY (EMERGENCY)
Facility: HOSPITAL | Age: 69
LOS: 1 days | Discharge: HOME | End: 2024-12-31
Attending: EMERGENCY MEDICINE | Admitting: EMERGENCY MEDICINE

## 2024-12-31 VITALS
HEIGHT: 65 IN | HEART RATE: 81 BPM | OXYGEN SATURATION: 97 % | TEMPERATURE: 97 F | DIASTOLIC BLOOD PRESSURE: 90 MMHG | SYSTOLIC BLOOD PRESSURE: 149 MMHG | RESPIRATION RATE: 18 BRPM | WEIGHT: 225.09 LBS

## 2024-12-31 VITALS
HEART RATE: 78 BPM | RESPIRATION RATE: 18 BRPM | SYSTOLIC BLOOD PRESSURE: 145 MMHG | DIASTOLIC BLOOD PRESSURE: 90 MMHG | OXYGEN SATURATION: 97 %

## 2024-12-31 DIAGNOSIS — D17.20 BENIGN LIPOMATOUS NEOPLASM OF SKIN AND SUBCUTANEOUS TISSUE OF UNSPECIFIED LIMB: Chronic | ICD-10-CM

## 2024-12-31 DIAGNOSIS — E89.0 POSTPROCEDURAL HYPOTHYROIDISM: Chronic | ICD-10-CM

## 2024-12-31 DIAGNOSIS — Z96.653 PRESENCE OF ARTIFICIAL KNEE JOINT, BILATERAL: Chronic | ICD-10-CM

## 2024-12-31 PROCEDURE — 99284 EMERGENCY DEPT VISIT MOD MDM: CPT

## 2024-12-31 RX ORDER — IBUPROFEN 200 MG
600 TABLET ORAL ONCE
Refills: 0 | Status: COMPLETED | OUTPATIENT
Start: 2024-12-31 | End: 2024-12-31

## 2024-12-31 RX ORDER — OXYCODONE HYDROCHLORIDE 30 MG/1
1 TABLET ORAL
Qty: 16 | Refills: 0
Start: 2024-12-31 | End: 2025-01-03

## 2024-12-31 RX ORDER — DIAZEPAM 10 MG/1
5 TABLET ORAL ONCE
Refills: 0 | Status: DISCONTINUED | OUTPATIENT
Start: 2024-12-31 | End: 2024-12-31

## 2024-12-31 RX ORDER — DIAZEPAM 10 MG/1
1 TABLET ORAL
Qty: 4 | Refills: 0
Start: 2024-12-31 | End: 2025-01-03

## 2024-12-31 RX ORDER — LIDOCAINE 40 MG/G
1 CREAM TOPICAL ONCE
Refills: 0 | Status: COMPLETED | OUTPATIENT
Start: 2024-12-31 | End: 2024-12-31

## 2024-12-31 RX ADMIN — DIAZEPAM 5 MILLIGRAM(S): 10 TABLET ORAL at 12:49

## 2024-12-31 RX ADMIN — Medication 600 MILLIGRAM(S): at 12:50

## 2024-12-31 RX ADMIN — LIDOCAINE 1 PATCH: 40 CREAM TOPICAL at 12:48

## 2024-12-31 RX ADMIN — Medication 4 MILLIGRAM(S): at 14:55

## 2024-12-31 NOTE — ED ADULT NURSE NOTE - NSFALLUNIVINTERV_ED_ALL_ED
Bed/Stretcher in lowest position, wheels locked, appropriate side rails in place/Call bell, personal items and telephone in reach/Instruct patient to call for assistance before getting out of bed/chair/stretcher/Non-slip footwear applied when patient is off stretcher/[place] to call system/Physically safe environment - no spills, clutter or unnecessary equipment/Purposeful proactive rounding/Room/bathroom lighting operational, light cord in reach

## 2024-12-31 NOTE — ED PROVIDER NOTE - OBJECTIVE STATEMENT
pt c/o lower back pain since yesterday with spasms.  pt was seen here yesterday for the same and d/c'd.  pt was prescibed medicine and says its not helping.  Hx:  HTN  lower back pain 68 yo F with PMHx HTN here with c/o lower back pain since yesterday with spasms. The pt was seen here yesterday for the same, given   pt was prescibed medicine and says its not helping.  Hx:  HTN  lower back pain 69-year-old female past medical history hypertension, chronic lumbar back pain presents for worsening of chronic, non-radiating right lumbar back pain.  The pt was seen here yesterday with the same symptoms which improved somewhat prior to discharge but returned today. The pt says that the pain has been progressively getting worse since July.  Patient denies any recent heavy lifting, exertion, prolonged uncomfortable position.  Reports pain worse with changing position from lying to sitting. Denies f/c, sob, cp, n/v, abdominal pain, paresthesias, weakness, saddle anesthesia, urinary/bowel incontinence.  Patient is been taking methocarbamol and tizanidine for the past several days without significant relief of symptoms.  Describes as spasm-like pain.

## 2024-12-31 NOTE — ED PROVIDER NOTE - CLINICAL SUMMARY MEDICAL DECISION MAKING FREE TEXT BOX
Will give the patient valium, lidocaine patch, morphine, motrin and reassess to be discharged with the same and spine follow up if improved. The pt is neurologically intact and does not have any alarm symptoms so will not image at this time.

## 2024-12-31 NOTE — ED ADULT NURSE NOTE - OBJECTIVE STATEMENT
Lunch coverage note: pt received to intake 10B, pt moaning c.o. severe back spasms. states she was seen here yesterday for the same. states pain is on rt  side of lower back. pain 10/10 at this time, medicated as ordered

## 2024-12-31 NOTE — ED PROVIDER NOTE - NSFOLLOWUPINSTRUCTIONS_ED_ALL_ED_FT
Advance activity as tolerated.  Continue all previously prescribed medications as directed unless otherwise instructed.  Follow up with your primary care physician in 48-72 hours- bring copies of your results.  Return to the ER for worsening or persistent symptoms, and/or ANY NEW OR CONCERNING SYMPTOMS. If you have issues obtaining follow up, please call: [phone] to obtain a doctor or specialist who takes your insurance in your area.  You may call [phone] to make an appointment with the internal medicine clinic.

## 2024-12-31 NOTE — ED ADULT TRIAGE NOTE - CHIEF COMPLAINT QUOTE
pt c/o lower back pain since yesterday with spasms.  pt was seen here yesterday for the same and d/c'd.  pt was prescibed medicine and says its not helping.  Hx:  HTN

## 2024-12-31 NOTE — ED PROVIDER NOTE - PATIENT PORTAL LINK FT
You can access the FollowMyHealth Patient Portal offered by [facility] by registering at the following website: [email or web address]. By joining [employer]’s FollowMyHealth portal, you will also be able to view your health information using other applications (apps) compatible with our system.

## 2025-01-06 ENCOUNTER — APPOINTMENT (OUTPATIENT)
Dept: ORTHOPEDIC SURGERY | Facility: CLINIC | Age: 70
End: 2025-01-06
Payer: MEDICARE

## 2025-01-06 DIAGNOSIS — M54.9 DORSALGIA, UNSPECIFIED: ICD-10-CM

## 2025-01-06 PROCEDURE — 99214 OFFICE O/P EST MOD 30 MIN: CPT

## 2025-01-06 RX ORDER — DICLOFENAC SODIUM 75 MG/1
75 TABLET, DELAYED RELEASE ORAL
Qty: 28 | Refills: 1 | Status: ACTIVE | COMMUNITY
Start: 2025-01-06 | End: 1900-01-01

## 2025-01-06 RX ORDER — TIZANIDINE 2 MG/1
2 TABLET ORAL EVERY 6 HOURS
Qty: 56 | Refills: 1 | Status: ACTIVE | COMMUNITY
Start: 2025-01-06 | End: 1900-01-01

## 2025-02-03 ENCOUNTER — APPOINTMENT (OUTPATIENT)
Dept: ORTHOPEDIC SURGERY | Facility: CLINIC | Age: 70
End: 2025-02-03
Payer: MEDICARE

## 2025-02-03 DIAGNOSIS — M54.9 DORSALGIA, UNSPECIFIED: ICD-10-CM

## 2025-02-03 PROCEDURE — 99214 OFFICE O/P EST MOD 30 MIN: CPT

## 2025-02-03 RX ORDER — TIZANIDINE 2 MG/1
2 TABLET ORAL EVERY 6 HOURS
Qty: 24 | Refills: 0 | Status: ACTIVE | COMMUNITY
Start: 2025-02-03 | End: 1900-01-01

## 2025-02-03 RX ORDER — DICLOFENAC SODIUM 75 MG/1
75 TABLET, DELAYED RELEASE ORAL
Qty: 40 | Refills: 0 | Status: ACTIVE | COMMUNITY
Start: 2025-02-03 | End: 1900-01-01

## 2025-06-05 ENCOUNTER — EMERGENCY (EMERGENCY)
Facility: HOSPITAL | Age: 70
LOS: 1 days | End: 2025-06-05
Attending: EMERGENCY MEDICINE | Admitting: EMERGENCY MEDICINE
Payer: MEDICARE

## 2025-06-05 VITALS
SYSTOLIC BLOOD PRESSURE: 113 MMHG | DIASTOLIC BLOOD PRESSURE: 71 MMHG | HEART RATE: 85 BPM | OXYGEN SATURATION: 95 % | RESPIRATION RATE: 18 BRPM | WEIGHT: 225.09 LBS | TEMPERATURE: 98 F

## 2025-06-05 VITALS
SYSTOLIC BLOOD PRESSURE: 119 MMHG | RESPIRATION RATE: 19 BRPM | HEART RATE: 84 BPM | TEMPERATURE: 98 F | DIASTOLIC BLOOD PRESSURE: 78 MMHG | OXYGEN SATURATION: 97 %

## 2025-06-05 DIAGNOSIS — Z96.653 PRESENCE OF ARTIFICIAL KNEE JOINT, BILATERAL: Chronic | ICD-10-CM

## 2025-06-05 DIAGNOSIS — D17.20 BENIGN LIPOMATOUS NEOPLASM OF SKIN AND SUBCUTANEOUS TISSUE OF UNSPECIFIED LIMB: Chronic | ICD-10-CM

## 2025-06-05 DIAGNOSIS — E89.0 POSTPROCEDURAL HYPOTHYROIDISM: Chronic | ICD-10-CM

## 2025-06-05 PROCEDURE — 99284 EMERGENCY DEPT VISIT MOD MDM: CPT

## 2025-06-05 RX ORDER — LIDOCAINE HYDROCHLORIDE 20 MG/ML
1 JELLY TOPICAL ONCE
Refills: 0 | Status: COMPLETED | OUTPATIENT
Start: 2025-06-05 | End: 2025-06-05

## 2025-06-05 RX ORDER — DIAZEPAM 5 MG/1
1 TABLET ORAL
Qty: 10 | Refills: 0
Start: 2025-06-05

## 2025-06-05 RX ORDER — OXYCODONE HYDROCHLORIDE AND ACETAMINOPHEN 10; 325 MG/1; MG/1
1 TABLET ORAL
Qty: 15 | Refills: 0
Start: 2025-06-05

## 2025-06-05 RX ORDER — IBUPROFEN 200 MG
1 TABLET ORAL
Qty: 30 | Refills: 0
Start: 2025-06-05

## 2025-06-05 RX ORDER — HYDROCORTISONE 10 MG/G
1 CREAM TOPICAL ONCE
Refills: 0 | Status: COMPLETED | OUTPATIENT
Start: 2025-06-05 | End: 2025-06-05

## 2025-06-05 RX ORDER — DIAZEPAM 5 MG/1
5 TABLET ORAL ONCE
Refills: 0 | Status: DISCONTINUED | OUTPATIENT
Start: 2025-06-05 | End: 2025-06-05

## 2025-06-05 RX ORDER — IBUPROFEN 200 MG
600 TABLET ORAL ONCE
Refills: 0 | Status: COMPLETED | OUTPATIENT
Start: 2025-06-05 | End: 2025-06-05

## 2025-06-05 RX ADMIN — HYDROCORTISONE 1 APPLICATION(S): 10 CREAM TOPICAL at 16:14

## 2025-06-05 RX ADMIN — LIDOCAINE HYDROCHLORIDE 1 PATCH: 20 JELLY TOPICAL at 16:15

## 2025-06-05 RX ADMIN — DIAZEPAM 5 MILLIGRAM(S): 5 TABLET ORAL at 16:14

## 2025-06-05 RX ADMIN — Medication 600 MILLIGRAM(S): at 16:14

## 2025-06-05 NOTE — ED PROVIDER NOTE - CLINICAL SUMMARY MEDICAL DECISION MAKING FREE TEXT BOX
69 y/o female acute on chronic back pain, non-infected bug bites to legs.  No fnd's, no f/c, no incont.  Will tx symptomatically.  Give pain med muscle relaxant lido patch steroid cream reassess likely dc with spine f/u as o/p.

## 2025-06-05 NOTE — ED ADULT TRIAGE NOTE - CHIEF COMPLAINT QUOTE
Pt c/o lower back spasms shooting up her back x Tuesday while camping. pt endorses getting bit by "something" Multiple bite marks on her legs and left wrist. pt denies chest pain, SOB, dysuria, headaches, dizziness. Hx of HTN, and chronic back pain.

## 2025-06-05 NOTE — ED PROVIDER NOTE - PROGRESS NOTE DETAILS
MD LIN:  Pt feels much improved.  Will dc with pain rx.  Will f/u with her back doctor.  She is calling her  for .

## 2025-06-05 NOTE — ED PROVIDER NOTE - OBJECTIVE STATEMENT
71 y/o female h/o htn and chronic back pain here with back pain.  Denies f/c numbness tingling weakness urinary or fecal incont.  Feels similar to pain episodes in the past.  Has a spine doctor she follows with.  Additional c/o bug bites to legs with itching.

## 2025-06-05 NOTE — ED PROVIDER NOTE - PATIENT PORTAL LINK FT
You can access the FollowMyHealth Patient Portal offered by [facility] by registering at the following website: [email or web address]. By joining "[employer]’s FollowMyHealth portal, you will also be able to view your health information using other applications (apps) compatible with our system.

## 2025-06-05 NOTE — ED ADULT NURSE NOTE - OBJECTIVE STATEMENT
Pt received to intake room 12. Pt is a 70 year old female with Hx of HTN, chronic back pain. Pt c/o lower back pain/spasms. denies falls, recent trauma to back. Pt also reports received multiple itchy bug bites to legs and left arm while camping. denies chest pain, SOB, headache, dizziness, nausea, urinary symptoms, numbness/tingling. Pt is A&Ox4, ambulatory without assistance. appears uncomfortable due to pain. airway patent, speaking clearly in full sentences. breathing is even and unlabored on room air. spontaneous movement of all extremities. comfort measures provided. stretcher set in lowest position, call bell within reach, safety maintained.

## 2025-06-05 NOTE — ED PROVIDER NOTE - NEUROLOGICAL, MLM
Alert and oriented, no focal deficits, no motor or sensory deficits.  able to fully wt bear on b/l le with pain.

## 2025-06-05 NOTE — ED ADULT NURSE REASSESSMENT NOTE - NS ED NURSE REASSESS COMMENT FT1
Patient able to ambulate without pain. No distress noted at this time. Patient to be discharged by MD.

## 2025-06-27 ENCOUNTER — EMERGENCY (EMERGENCY)
Facility: HOSPITAL | Age: 70
LOS: 1 days | End: 2025-06-27
Attending: STUDENT IN AN ORGANIZED HEALTH CARE EDUCATION/TRAINING PROGRAM | Admitting: STUDENT IN AN ORGANIZED HEALTH CARE EDUCATION/TRAINING PROGRAM
Payer: MEDICARE

## 2025-06-27 VITALS
OXYGEN SATURATION: 94 % | DIASTOLIC BLOOD PRESSURE: 82 MMHG | SYSTOLIC BLOOD PRESSURE: 137 MMHG | HEART RATE: 102 BPM | TEMPERATURE: 98 F | RESPIRATION RATE: 20 BRPM | WEIGHT: 210.1 LBS

## 2025-06-27 VITALS
DIASTOLIC BLOOD PRESSURE: 70 MMHG | HEART RATE: 90 BPM | RESPIRATION RATE: 16 BRPM | TEMPERATURE: 98 F | OXYGEN SATURATION: 97 % | SYSTOLIC BLOOD PRESSURE: 105 MMHG

## 2025-06-27 DIAGNOSIS — Z96.653 PRESENCE OF ARTIFICIAL KNEE JOINT, BILATERAL: Chronic | ICD-10-CM

## 2025-06-27 DIAGNOSIS — D17.20 BENIGN LIPOMATOUS NEOPLASM OF SKIN AND SUBCUTANEOUS TISSUE OF UNSPECIFIED LIMB: Chronic | ICD-10-CM

## 2025-06-27 DIAGNOSIS — E89.0 POSTPROCEDURAL HYPOTHYROIDISM: Chronic | ICD-10-CM

## 2025-06-27 LAB
ALBUMIN SERPL ELPH-MCNC: 4 G/DL — SIGNIFICANT CHANGE UP (ref 3.3–5)
ALP SERPL-CCNC: 139 U/L — HIGH (ref 40–120)
ALT FLD-CCNC: 26 U/L — SIGNIFICANT CHANGE UP (ref 4–33)
ANION GAP SERPL CALC-SCNC: 16 MMOL/L — HIGH (ref 7–14)
APPEARANCE UR: CLEAR — SIGNIFICANT CHANGE UP
AST SERPL-CCNC: 33 U/L — HIGH (ref 4–32)
BASOPHILS # BLD AUTO: 0.03 K/UL — SIGNIFICANT CHANGE UP (ref 0–0.2)
BASOPHILS NFR BLD AUTO: 0.6 % — SIGNIFICANT CHANGE UP (ref 0–2)
BILIRUB SERPL-MCNC: 1 MG/DL — SIGNIFICANT CHANGE UP (ref 0.2–1.2)
BILIRUB UR-MCNC: NEGATIVE — SIGNIFICANT CHANGE UP
BUN SERPL-MCNC: 26 MG/DL — HIGH (ref 7–23)
CALCIUM SERPL-MCNC: 10.2 MG/DL — SIGNIFICANT CHANGE UP (ref 8.4–10.5)
CHLORIDE SERPL-SCNC: 97 MMOL/L — LOW (ref 98–107)
CO2 SERPL-SCNC: 24 MMOL/L — SIGNIFICANT CHANGE UP (ref 22–31)
COLOR SPEC: YELLOW — SIGNIFICANT CHANGE UP
CREAT SERPL-MCNC: 1.05 MG/DL — SIGNIFICANT CHANGE UP (ref 0.5–1.3)
DIFF PNL FLD: NEGATIVE — SIGNIFICANT CHANGE UP
EGFR: 57 ML/MIN/1.73M2 — LOW
EGFR: 57 ML/MIN/1.73M2 — LOW
EOSINOPHIL # BLD AUTO: 0.06 K/UL — SIGNIFICANT CHANGE UP (ref 0–0.5)
EOSINOPHIL NFR BLD AUTO: 1.3 % — SIGNIFICANT CHANGE UP (ref 0–6)
GLUCOSE SERPL-MCNC: 93 MG/DL — SIGNIFICANT CHANGE UP (ref 70–99)
GLUCOSE UR QL: NEGATIVE MG/DL — SIGNIFICANT CHANGE UP
HCT VFR BLD CALC: 43 % — SIGNIFICANT CHANGE UP (ref 34.5–45)
HGB BLD-MCNC: 14.5 G/DL — SIGNIFICANT CHANGE UP (ref 11.5–15.5)
IMM GRANULOCYTES # BLD AUTO: 0.01 K/UL — SIGNIFICANT CHANGE UP (ref 0–0.07)
IMM GRANULOCYTES NFR BLD AUTO: 0.2 % — SIGNIFICANT CHANGE UP (ref 0–0.9)
KETONES UR QL: 40 MG/DL
LEUKOCYTE ESTERASE UR-ACNC: NEGATIVE — SIGNIFICANT CHANGE UP
LYMPHOCYTES # BLD AUTO: 1.76 K/UL — SIGNIFICANT CHANGE UP (ref 1–3.3)
LYMPHOCYTES NFR BLD AUTO: 36.9 % — SIGNIFICANT CHANGE UP (ref 13–44)
MCHC RBC-ENTMCNC: 29.2 PG — SIGNIFICANT CHANGE UP (ref 27–34)
MCHC RBC-ENTMCNC: 33.7 G/DL — SIGNIFICANT CHANGE UP (ref 32–36)
MCV RBC AUTO: 86.7 FL — SIGNIFICANT CHANGE UP (ref 80–100)
MONOCYTES # BLD AUTO: 0.4 K/UL — SIGNIFICANT CHANGE UP (ref 0–0.9)
MONOCYTES NFR BLD AUTO: 8.4 % — SIGNIFICANT CHANGE UP (ref 2–14)
NEUTROPHILS # BLD AUTO: 2.51 K/UL — SIGNIFICANT CHANGE UP (ref 1.8–7.4)
NEUTROPHILS NFR BLD AUTO: 52.6 % — SIGNIFICANT CHANGE UP (ref 43–77)
NITRITE UR-MCNC: NEGATIVE — SIGNIFICANT CHANGE UP
NRBC # BLD AUTO: 0 K/UL — SIGNIFICANT CHANGE UP (ref 0–0)
NRBC # FLD: 0 K/UL — SIGNIFICANT CHANGE UP (ref 0–0)
NRBC BLD AUTO-RTO: 0 /100 WBCS — SIGNIFICANT CHANGE UP (ref 0–0)
PH UR: 6.5 — SIGNIFICANT CHANGE UP (ref 5–8)
PLATELET # BLD AUTO: 180 K/UL — SIGNIFICANT CHANGE UP (ref 150–400)
PMV BLD: 11.4 FL — SIGNIFICANT CHANGE UP (ref 7–13)
POTASSIUM SERPL-MCNC: 3.4 MMOL/L — LOW (ref 3.5–5.3)
POTASSIUM SERPL-SCNC: 3.4 MMOL/L — LOW (ref 3.5–5.3)
PROT SERPL-MCNC: 7.2 G/DL — SIGNIFICANT CHANGE UP (ref 6–8.3)
PROT UR-MCNC: NEGATIVE MG/DL — SIGNIFICANT CHANGE UP
RBC # BLD: 4.96 M/UL — SIGNIFICANT CHANGE UP (ref 3.8–5.2)
RBC # FLD: 13.9 % — SIGNIFICANT CHANGE UP (ref 10.3–14.5)
SODIUM SERPL-SCNC: 137 MMOL/L — SIGNIFICANT CHANGE UP (ref 135–145)
SP GR SPEC: 1.01 — SIGNIFICANT CHANGE UP (ref 1–1.03)
UROBILINOGEN FLD QL: 0.2 MG/DL — SIGNIFICANT CHANGE UP (ref 0.2–1)
WBC # BLD: 4.77 K/UL — SIGNIFICANT CHANGE UP (ref 3.8–10.5)
WBC # FLD AUTO: 4.77 K/UL — SIGNIFICANT CHANGE UP (ref 3.8–10.5)

## 2025-06-27 PROCEDURE — 99284 EMERGENCY DEPT VISIT MOD MDM: CPT

## 2025-06-27 PROCEDURE — 72131 CT LUMBAR SPINE W/O DYE: CPT | Mod: 26

## 2025-06-27 RX ORDER — ACETAMINOPHEN 500 MG/5ML
1000 LIQUID (ML) ORAL ONCE
Refills: 0 | Status: COMPLETED | OUTPATIENT
Start: 2025-06-27 | End: 2025-06-27

## 2025-06-27 RX ORDER — DIAZEPAM 5 MG/1
2 TABLET ORAL ONCE
Refills: 0 | Status: DISCONTINUED | OUTPATIENT
Start: 2025-06-27 | End: 2025-06-27

## 2025-06-27 RX ORDER — DIAZEPAM 5 MG/1
1 TABLET ORAL
Qty: 6 | Refills: 0
Start: 2025-06-27

## 2025-06-27 RX ORDER — KETOROLAC TROMETHAMINE 30 MG/ML
15 INJECTION, SOLUTION INTRAMUSCULAR; INTRAVENOUS ONCE
Refills: 0 | Status: DISCONTINUED | OUTPATIENT
Start: 2025-06-27 | End: 2025-06-27

## 2025-06-27 RX ADMIN — Medication 1000 MILLIGRAM(S): at 10:15

## 2025-06-27 RX ADMIN — KETOROLAC TROMETHAMINE 15 MILLIGRAM(S): 30 INJECTION, SOLUTION INTRAMUSCULAR; INTRAVENOUS at 10:43

## 2025-06-27 RX ADMIN — Medication 400 MILLIGRAM(S): at 09:43

## 2025-06-27 RX ADMIN — DIAZEPAM 2 MILLIGRAM(S): 5 TABLET ORAL at 09:43

## 2025-06-27 RX ADMIN — Medication 1000 MILLIGRAM(S): at 10:43

## 2025-06-27 RX ADMIN — KETOROLAC TROMETHAMINE 15 MILLIGRAM(S): 30 INJECTION, SOLUTION INTRAMUSCULAR; INTRAVENOUS at 09:43

## 2025-06-27 NOTE — ED PROVIDER NOTE - PROGRESS NOTE DETAILS
Wyatt Damon MD: Patient reassessed, pain is improved. CT imaging shows chronic spine degeneration with no acute fractures. Will discharge with short course of Valium and will refer to a non surgical spine specialist.

## 2025-06-27 NOTE — ED PROVIDER NOTE - CLINICAL SUMMARY MEDICAL DECISION MAKING FREE TEXT BOX
HPI: 70-year-old female with past medical history of hypertension, chronic back pain secondary to degenerative disc disease and osteoarthritis presents the emergency department for acute on chronic back pain.  She states that the medication she was prescribed which includes cyclobenzaprine and the under milligrams Motrin have not been effective at home.  She follows with Dr. Flood of spine surgery.  She denies red flag symptoms including urinary or fecal incontinence, perineal numbness, numbness/tingling/weakness of lower extremities.  No fevers or chills.  No recent injections.  She states that she had advanced imaging of the spine 4 months ago but does not have results available.    Exam: General: alert, oriented to person, time, place  Psych: mood appropriate  Head: normocephalic; atraumatic  Eyes: conjunctivae clear bilaterally, sclerae anicteric  ENT: no nasal flaring, patent nares  Cardio: Skin warm and well-perfused  Resp: Normal work of breathing  GI: Soft nontender, no active vomiting  Neuro: Normal strength and sensation bilateral lower extremities  Skin: No rashes or bruising noted  MSK: Midline spinal tenderness in the L1-L5 region  Lymph/Vasc: No LE edema    Summary: Elderly female with chronic back pain here for acute exacerbation of back pain without red flag symptoms.    DDx: Acute exacerbation of chronic degenerative disc disease, less likely urinary tract infection    Plan: IV for potential admission with basic labs, pain control with Toradol, acetaminophen, oral Valium.  CT lumbar.    Disposition: Pending CT findings and reassessment of pain control

## 2025-06-27 NOTE — ED PROVIDER NOTE - NSFOLLOWUPINSTRUCTIONS_ED_ALL_ED_FT
You may continue to experience pain after being discharged.  For your pain, you can take 1000 mg of acetaminophen (brand name Tylenol®) every 6 hours.  If you still have pain, you can also take 400 mg of ibuprofen (brand names Motrin® or Advil®) every 6 hours.  For better pain control, it is recommended that you alternate these medications every 3 hours.  You should not take more than 4 doses of each medication in a 24-hour period.    A prescription was also sent to your pharmacy for Valium.  Please take as instructed.  You should not operate heavy machinery or drive while taking this medication as it is sedating.    A referral is included in this paperwork for a spine doctor that you can follow-up with.    Return to the emergency department if you lose control of your bowels or bladder, have weakness in your legs, or other new concerning symptoms.

## 2025-06-27 NOTE — ED PROVIDER NOTE - CARE PROVIDER_API CALL
Thony Forrest  Physical Medicine & Rehabilitation  [address] [place] [address], [address]  [place] [address]  Phone: [phone]  Fax: [phone]  Follow Up Time:

## 2025-06-27 NOTE — ED ADULT TRIAGE NOTE - CHIEF COMPLAINT QUOTE
Patient complains of lower back pain that started yesterday evening. Patient denies any recent falls or injury to back, denies any burning or pain on urination. pmhx: HTN

## 2025-06-27 NOTE — ED PROVIDER NOTE - PATIENT PORTAL LINK FT
You can access the FollowMyHealth Patient Portal offered by [facility] by registering at the following website: [email or web address]. By joining [employer]’s FollowMyHealth portal, you will also be able to view your health information using other applications (apps) compatible with our system.

## 2025-06-27 NOTE — ED PROVIDER NOTE - ATTENDING CONTRIBUTION TO CARE
70F  - Reports severe acute on chronic back pain, unable to get up this morning secondary to the pain. Pain started yesterday, ongoing since 06/05/2025.  -Pain is worse with movement  - Pain never fully resolved since previous ED visit  - No falls, trauma, fever chills diaphoresis, weakness, saddle anesthesia, numbness, recent procedures    Past Medical History:  - High blood pressure, on medication.  - Tetanus allergy.  - Previous spinal imaging 4-5 months ago, ordered by spine surgeon per the patient.  - History of Last ablation approximately 2 years ago, not very effective. No epidural injection.  - Currently taking Flexeril 10mg for pain, but it is not effective. Also tried Robaxin and methocarbamol, which were ineffective. Takes Tylenol and Motrin regularly, also ineffective.      PE:   GENERAL: NAD, activity normal for age, well developed/ well nourished, no cyanosis, pallor, or diaphoresis.  EYES: lids/conjunctiva normal.   EARS/NOSE/THROAT: Mucous membranes moist, nares normal, lips/teeth normal uvula midline without oral pharyngeal erythema, exudate or swelling TMs normal bilaterally. No lymphangitis/lymphedema.  HEAD/NECK: normocephalic atraumatic, no facial trauma, neck is supple.  RESPIRATORY: respiratory effort normal, speaks in full sentences, no tripod position, no accessory muscle use. Lungs clear to auscultation without rhonchi, wheezes, rales  CARDIAC: Regular rate and rhythm without murmurs, rubs or gallops, no peripheral edema. 2+ radial/PT and DP pulses bilaterally  ABDOMINAL: Soft, ND/NT. No evidence of fluid wave. No pulsatile masses on exam, rebound tenderness, Carr sign or pain over Mcburney's point.  MUSCLES/EXTREMITIES: FROM in all extremities, no joint swelling or tenderness. Palpation of L1-L2 region midline elicits pain. Pain mostly on the spine, not as much paraspinally  SKIN: Warm, pink and dry. No rashes, dermatoses, petechiae or lesions. no erythema to the back.  NEUROLOGICAL: Speech is clear and appropriate. Normal level of consciousness. 5/5 strenght to b/l lower extremities, sensation intact to b/l LE  PSYCH: Normal mood and affect. Judgement/competence is appropriate     MDM:   Patient with acute on chronic back pain.  Vital signs are stable with mild tachycardia secondary to pain.  Last MRI was in 2019 which showed diffuse disc bulge.  No recent imaging on file.  Will obtain basic, CT lumbar and pain control.  Dispo pending.  No red flag signs concerning for cauda equina, spinal epidural abscess or transverse myelitis

## (undated) DEVICE — DRSG MASTISOL

## (undated) DEVICE — PLV-SCD MACHINE: Type: DURABLE MEDICAL EQUIPMENT

## (undated) DEVICE — ELCTR BOVIE TIP BLADE INSULATED 2.75" EDGE

## (undated) DEVICE — WARMING BLANKET UPPER ADULT

## (undated) DEVICE — SUT POLYSORB 3-0 30" V-20 UNDYED

## (undated) DEVICE — VENODYNE/SCD SLEEVE CALF MEDIUM

## (undated) DEVICE — AVETA FLUID MANAGEMENT ACCESSORY W CAP

## (undated) DEVICE — AVETA FLUID MANAGEMENT ACCESSORY

## (undated) DEVICE — DRAPE 1/2 SHEET 40X57"

## (undated) DEVICE — PACK LITHOTOMY

## (undated) DEVICE — DRAPE 3/4 SHEET W REINFORCEMENT 56X77"

## (undated) DEVICE — DRAPE TOWEL BLUE 17" X 24"

## (undated) DEVICE — PREP BETADINE KIT

## (undated) DEVICE — OS FINDER

## (undated) DEVICE — NSA-VIDEO TOWER - STRYKER: Type: DURABLE MEDICAL EQUIPMENT

## (undated) DEVICE — POSITIONER CUSHION INSERT PRONE VIEW LG

## (undated) DEVICE — DRAPE LIGHT HANDLE COVER (GREEN)

## (undated) DEVICE — GOWN TRIMAX LG

## (undated) DEVICE — AVETA CORAL HYSTEROSCOPE 4.6MM DISP

## (undated) DEVICE — SUT POLYSORB 2-0 18" V-20

## (undated) DEVICE — SOL IRR POUR NS 0.9% 500ML

## (undated) DEVICE — GLV 8 PROTEXIS (WHITE)

## (undated) DEVICE — BLADE SCALPEL SAFETYLOCK #15

## (undated) DEVICE — DRSG TAPE MEDIPORE 4"

## (undated) DEVICE — SOL IRR BAG NS 0.9% 3000ML

## (undated) DEVICE — PACK MINOR WITH LAP

## (undated) DEVICE — GLV 7 PROTEXIS (WHITE)

## (undated) DEVICE — DRSG TAPE MEDIPORE 3"

## (undated) DEVICE — SUT MONOCRYL 3-0 18" PS-2 UNDYED

## (undated) DEVICE — CURETTE ENDOMETRIAL GYNOSAMPLER 23.6CM

## (undated) DEVICE — Device

## (undated) DEVICE — SUT VLOC 180 3-0 18" P-12 UNDYED

## (undated) DEVICE — VENODYNE/SCD SLEEVE CALF LARGE

## (undated) DEVICE — SOL IRR POUR NS 0.9% 1000ML

## (undated) DEVICE — WARMING BLANKET LOWER ADULT

## (undated) DEVICE — POSITIONER FOAM EGG CRATE ULNAR 2PCS (PINK)